# Patient Record
Sex: MALE | Race: WHITE | NOT HISPANIC OR LATINO | Employment: OTHER | URBAN - METROPOLITAN AREA
[De-identification: names, ages, dates, MRNs, and addresses within clinical notes are randomized per-mention and may not be internally consistent; named-entity substitution may affect disease eponyms.]

---

## 2019-04-29 ENCOUNTER — HOSPITAL ENCOUNTER (OUTPATIENT)
Facility: HOSPITAL | Age: 84
Discharge: NURSING, REHAB OR LONG-TERM CARE | End: 2019-05-01
Attending: EMERGENCY MEDICINE | Admitting: INTERNAL MEDICINE
Payer: MEDICARE

## 2019-04-29 DIAGNOSIS — I50.22 CHRONIC SYSTOLIC HEART FAILURE: ICD-10-CM

## 2019-04-29 DIAGNOSIS — I63.9 STROKE: ICD-10-CM

## 2019-04-29 DIAGNOSIS — R42 VERTIGO: Primary | ICD-10-CM

## 2019-04-29 DIAGNOSIS — I65.02 VERTEBRAL ARTERY OCCLUSION, LEFT: ICD-10-CM

## 2019-04-29 DIAGNOSIS — G45.9 TIA (TRANSIENT ISCHEMIC ATTACK): ICD-10-CM

## 2019-04-29 LAB
ALBUMIN SERPL BCP-MCNC: 3.6 G/DL (ref 3.5–5.2)
ALP SERPL-CCNC: 64 U/L (ref 55–135)
ALT SERPL W/O P-5'-P-CCNC: 11 U/L (ref 10–44)
ANION GAP SERPL CALC-SCNC: 10 MMOL/L (ref 8–16)
AST SERPL-CCNC: 20 U/L (ref 10–40)
BASOPHILS # BLD AUTO: 0.01 K/UL (ref 0–0.2)
BASOPHILS NFR BLD: 0.2 % (ref 0–1.9)
BILIRUB SERPL-MCNC: 0.4 MG/DL (ref 0.1–1)
BUN SERPL-MCNC: 11 MG/DL (ref 8–23)
CALCIUM SERPL-MCNC: 9.2 MG/DL (ref 8.7–10.5)
CHLORIDE SERPL-SCNC: 102 MMOL/L (ref 95–110)
CHOLEST SERPL-MCNC: 197 MG/DL (ref 120–199)
CHOLEST/HDLC SERPL: 4.2 {RATIO} (ref 2–5)
CO2 SERPL-SCNC: 21 MMOL/L (ref 23–29)
CREAT SERPL-MCNC: 1 MG/DL (ref 0.5–1.4)
DIFFERENTIAL METHOD: ABNORMAL
EOSINOPHIL # BLD AUTO: 0 K/UL (ref 0–0.5)
EOSINOPHIL NFR BLD: 0.7 % (ref 0–8)
ERYTHROCYTE [DISTWIDTH] IN BLOOD BY AUTOMATED COUNT: 15 % (ref 11.5–14.5)
EST. GFR  (AFRICAN AMERICAN): >60 ML/MIN/1.73 M^2
EST. GFR  (NON AFRICAN AMERICAN): >60 ML/MIN/1.73 M^2
GLUCOSE SERPL-MCNC: 100 MG/DL (ref 70–110)
GLUCOSE SERPL-MCNC: 103 MG/DL (ref 70–110)
HCT VFR BLD AUTO: 37.5 % (ref 40–54)
HDLC SERPL-MCNC: 47 MG/DL (ref 40–75)
HDLC SERPL: 23.9 % (ref 20–50)
HGB BLD-MCNC: 12.5 G/DL (ref 14–18)
INR PPP: 1 (ref 0.8–1.2)
LDLC SERPL CALC-MCNC: 136 MG/DL (ref 63–159)
LYMPHOCYTES # BLD AUTO: 1.2 K/UL (ref 1–4.8)
LYMPHOCYTES NFR BLD: 28 % (ref 18–48)
MCH RBC QN AUTO: 28.5 PG (ref 27–31)
MCHC RBC AUTO-ENTMCNC: 33.3 G/DL (ref 32–36)
MCV RBC AUTO: 86 FL (ref 82–98)
MONOCYTES # BLD AUTO: 0.5 K/UL (ref 0.3–1)
MONOCYTES NFR BLD: 12.5 % (ref 4–15)
NEUTROPHILS # BLD AUTO: 2.4 K/UL (ref 1.8–7.7)
NEUTROPHILS NFR BLD: 58.4 % (ref 38–73)
NONHDLC SERPL-MCNC: 150 MG/DL
PLATELET # BLD AUTO: 245 K/UL (ref 150–350)
PMV BLD AUTO: 10.3 FL (ref 9.2–12.9)
POCT GLUCOSE: 103 MG/DL (ref 70–110)
POTASSIUM SERPL-SCNC: 4.3 MMOL/L (ref 3.5–5.1)
PROT SERPL-MCNC: 6.9 G/DL (ref 6–8.4)
PROTHROMBIN TIME: 10.5 SEC (ref 9–12.5)
RBC # BLD AUTO: 4.38 M/UL (ref 4.6–6.2)
SODIUM SERPL-SCNC: 133 MMOL/L (ref 136–145)
TRIGL SERPL-MCNC: 70 MG/DL (ref 30–150)
TSH SERPL DL<=0.005 MIU/L-ACNC: 1.02 UIU/ML (ref 0.4–4)
WBC # BLD AUTO: 4.15 K/UL (ref 3.9–12.7)

## 2019-04-29 PROCEDURE — 82962 GLUCOSE BLOOD TEST: CPT

## 2019-04-29 PROCEDURE — 80053 COMPREHEN METABOLIC PANEL: CPT

## 2019-04-29 PROCEDURE — 25500020 PHARM REV CODE 255: Performed by: EMERGENCY MEDICINE

## 2019-04-29 PROCEDURE — 82607 VITAMIN B-12: CPT

## 2019-04-29 PROCEDURE — 82746 ASSAY OF FOLIC ACID SERUM: CPT

## 2019-04-29 PROCEDURE — 99291 CRITICAL CARE FIRST HOUR: CPT | Mod: 25

## 2019-04-29 PROCEDURE — 93005 ELECTROCARDIOGRAM TRACING: CPT

## 2019-04-29 PROCEDURE — 80061 LIPID PANEL: CPT

## 2019-04-29 PROCEDURE — 85025 COMPLETE CBC W/AUTO DIFF WBC: CPT

## 2019-04-29 PROCEDURE — 25000003 PHARM REV CODE 250: Performed by: EMERGENCY MEDICINE

## 2019-04-29 PROCEDURE — 85610 PROTHROMBIN TIME: CPT

## 2019-04-29 PROCEDURE — 84443 ASSAY THYROID STIM HORMONE: CPT

## 2019-04-29 RX ORDER — CLOPIDOGREL BISULFATE 75 MG/1
75 TABLET ORAL
Status: COMPLETED | OUTPATIENT
Start: 2019-04-29 | End: 2019-04-29

## 2019-04-29 RX ORDER — ASPIRIN 325 MG
325 TABLET ORAL
Status: COMPLETED | OUTPATIENT
Start: 2019-04-29 | End: 2019-04-29

## 2019-04-29 RX ADMIN — ASPIRIN 325 MG ORAL TABLET 325 MG: 325 PILL ORAL at 10:04

## 2019-04-29 RX ADMIN — CLOPIDOGREL BISULFATE 75 MG: 75 TABLET ORAL at 10:04

## 2019-04-29 RX ADMIN — IOHEXOL 100 ML: 350 INJECTION, SOLUTION INTRAVENOUS at 09:04

## 2019-04-30 LAB
ALBUMIN SERPL BCP-MCNC: 3.3 G/DL (ref 3.5–5.2)
ALP SERPL-CCNC: 61 U/L (ref 55–135)
ALT SERPL W/O P-5'-P-CCNC: 10 U/L (ref 10–44)
ANION GAP SERPL CALC-SCNC: 11 MMOL/L (ref 8–16)
APTT BLDCRRT: 31 SEC (ref 21–32)
AST SERPL-CCNC: 20 U/L (ref 10–40)
BASOPHILS # BLD AUTO: 0.01 K/UL (ref 0–0.2)
BASOPHILS NFR BLD: 0.3 % (ref 0–1.9)
BILIRUB SERPL-MCNC: 0.5 MG/DL (ref 0.1–1)
BUN SERPL-MCNC: 10 MG/DL (ref 8–23)
CALCIUM SERPL-MCNC: 9 MG/DL (ref 8.7–10.5)
CHLORIDE SERPL-SCNC: 102 MMOL/L (ref 95–110)
CO2 SERPL-SCNC: 21 MMOL/L (ref 23–29)
CREAT SERPL-MCNC: 0.9 MG/DL (ref 0.5–1.4)
DIFFERENTIAL METHOD: ABNORMAL
EOSINOPHIL # BLD AUTO: 0 K/UL (ref 0–0.5)
EOSINOPHIL NFR BLD: 1.2 % (ref 0–8)
ERYTHROCYTE [DISTWIDTH] IN BLOOD BY AUTOMATED COUNT: 15 % (ref 11.5–14.5)
EST. GFR  (AFRICAN AMERICAN): >60 ML/MIN/1.73 M^2
EST. GFR  (NON AFRICAN AMERICAN): >60 ML/MIN/1.73 M^2
ESTIMATED AVG GLUCOSE: 111 MG/DL (ref 68–131)
FOLATE SERPL-MCNC: 14.5 NG/ML (ref 4–24)
GLUCOSE SERPL-MCNC: 84 MG/DL (ref 70–110)
HBA1C MFR BLD HPLC: 5.5 % (ref 4–5.6)
HCT VFR BLD AUTO: 37.4 % (ref 40–54)
HGB BLD-MCNC: 12.4 G/DL (ref 14–18)
INR PPP: 1 (ref 0.8–1.2)
LYMPHOCYTES # BLD AUTO: 1.2 K/UL (ref 1–4.8)
LYMPHOCYTES NFR BLD: 36.4 % (ref 18–48)
MAGNESIUM SERPL-MCNC: 1.8 MG/DL (ref 1.6–2.6)
MCH RBC QN AUTO: 28.5 PG (ref 27–31)
MCHC RBC AUTO-ENTMCNC: 33.2 G/DL (ref 32–36)
MCV RBC AUTO: 86 FL (ref 82–98)
MONOCYTES # BLD AUTO: 0.4 K/UL (ref 0.3–1)
MONOCYTES NFR BLD: 12.3 % (ref 4–15)
NEUTROPHILS # BLD AUTO: 1.6 K/UL (ref 1.8–7.7)
NEUTROPHILS NFR BLD: 49.8 % (ref 38–73)
PHOSPHATE SERPL-MCNC: 3.7 MG/DL (ref 2.7–4.5)
PLATELET # BLD AUTO: 234 K/UL (ref 150–350)
PMV BLD AUTO: 10.1 FL (ref 9.2–12.9)
POTASSIUM SERPL-SCNC: 4.3 MMOL/L (ref 3.5–5.1)
PROT SERPL-MCNC: 6.5 G/DL (ref 6–8.4)
PROTHROMBIN TIME: 10.4 SEC (ref 9–12.5)
RBC # BLD AUTO: 4.35 M/UL (ref 4.6–6.2)
SODIUM SERPL-SCNC: 134 MMOL/L (ref 136–145)
TROPONIN I SERPL DL<=0.01 NG/ML-MCNC: 0.05 NG/ML (ref 0–0.03)
VIT B12 SERPL-MCNC: 337 PG/ML (ref 210–950)
WBC # BLD AUTO: 3.24 K/UL (ref 3.9–12.7)

## 2019-04-30 PROCEDURE — 83735 ASSAY OF MAGNESIUM: CPT

## 2019-04-30 PROCEDURE — 83036 HEMOGLOBIN GLYCOSYLATED A1C: CPT

## 2019-04-30 PROCEDURE — 97116 GAIT TRAINING THERAPY: CPT

## 2019-04-30 PROCEDURE — 97161 PT EVAL LOW COMPLEX 20 MIN: CPT

## 2019-04-30 PROCEDURE — 92610 EVALUATE SWALLOWING FUNCTION: CPT

## 2019-04-30 PROCEDURE — 25000003 PHARM REV CODE 250: Performed by: INTERNAL MEDICINE

## 2019-04-30 PROCEDURE — 86580 TB INTRADERMAL TEST: CPT | Performed by: STUDENT IN AN ORGANIZED HEALTH CARE EDUCATION/TRAINING PROGRAM

## 2019-04-30 PROCEDURE — 94761 N-INVAS EAR/PLS OXIMETRY MLT: CPT

## 2019-04-30 PROCEDURE — 84100 ASSAY OF PHOSPHORUS: CPT

## 2019-04-30 PROCEDURE — 63600175 PHARM REV CODE 636 W HCPCS: Performed by: STUDENT IN AN ORGANIZED HEALTH CARE EDUCATION/TRAINING PROGRAM

## 2019-04-30 PROCEDURE — 85610 PROTHROMBIN TIME: CPT

## 2019-04-30 PROCEDURE — 36415 COLL VENOUS BLD VENIPUNCTURE: CPT

## 2019-04-30 PROCEDURE — 96372 THER/PROPH/DIAG INJ SC/IM: CPT

## 2019-04-30 PROCEDURE — G0378 HOSPITAL OBSERVATION PER HR: HCPCS

## 2019-04-30 PROCEDURE — 84484 ASSAY OF TROPONIN QUANT: CPT

## 2019-04-30 PROCEDURE — 85730 THROMBOPLASTIN TIME PARTIAL: CPT

## 2019-04-30 PROCEDURE — 97165 OT EVAL LOW COMPLEX 30 MIN: CPT

## 2019-04-30 PROCEDURE — 80053 COMPREHEN METABOLIC PANEL: CPT

## 2019-04-30 PROCEDURE — 63600175 PHARM REV CODE 636 W HCPCS: Performed by: INTERNAL MEDICINE

## 2019-04-30 PROCEDURE — 85025 COMPLETE CBC W/AUTO DIFF WBC: CPT

## 2019-04-30 PROCEDURE — 92523 SPEECH SOUND LANG COMPREHEN: CPT

## 2019-04-30 RX ORDER — CLOPIDOGREL BISULFATE 75 MG/1
75 TABLET ORAL DAILY
Status: DISCONTINUED | OUTPATIENT
Start: 2019-04-30 | End: 2019-04-30

## 2019-04-30 RX ORDER — HEPARIN SODIUM 5000 [USP'U]/ML
5000 INJECTION, SOLUTION INTRAVENOUS; SUBCUTANEOUS EVERY 8 HOURS
Status: DISCONTINUED | OUTPATIENT
Start: 2019-04-30 | End: 2019-05-01 | Stop reason: HOSPADM

## 2019-04-30 RX ORDER — CLOPIDOGREL BISULFATE 75 MG/1
75 TABLET ORAL DAILY
Status: DISCONTINUED | OUTPATIENT
Start: 2019-04-30 | End: 2019-05-01 | Stop reason: HOSPADM

## 2019-04-30 RX ORDER — ASPIRIN 81 MG/1
81 TABLET ORAL DAILY
Status: DISCONTINUED | OUTPATIENT
Start: 2019-04-30 | End: 2019-05-01 | Stop reason: HOSPADM

## 2019-04-30 RX ORDER — DIPHENHYDRAMINE HCL 25 MG
25 CAPSULE ORAL EVERY 6 HOURS PRN
COMMUNITY

## 2019-04-30 RX ORDER — RAMELTEON 8 MG/1
8 TABLET ORAL NIGHTLY
Status: DISCONTINUED | OUTPATIENT
Start: 2019-04-30 | End: 2019-05-01 | Stop reason: HOSPADM

## 2019-04-30 RX ORDER — SODIUM CHLORIDE 0.9 % (FLUSH) 0.9 %
10 SYRINGE (ML) INJECTION
Status: DISCONTINUED | OUTPATIENT
Start: 2019-04-30 | End: 2019-05-01 | Stop reason: HOSPADM

## 2019-04-30 RX ORDER — ATORVASTATIN CALCIUM 40 MG/1
40 TABLET, FILM COATED ORAL DAILY
Status: DISCONTINUED | OUTPATIENT
Start: 2019-04-30 | End: 2019-05-01 | Stop reason: HOSPADM

## 2019-04-30 RX ADMIN — HEPARIN SODIUM 5000 UNITS: 5000 INJECTION, SOLUTION INTRAVENOUS; SUBCUTANEOUS at 09:04

## 2019-04-30 RX ADMIN — TUBERCULIN PURIFIED PROTEIN DERIVATIVE 5 UNITS: 5 INJECTION INTRADERMAL at 02:04

## 2019-04-30 RX ADMIN — CLOPIDOGREL BISULFATE 75 MG: 75 TABLET ORAL at 09:04

## 2019-04-30 RX ADMIN — ATORVASTATIN CALCIUM 40 MG: 40 TABLET, FILM COATED ORAL at 09:04

## 2019-04-30 RX ADMIN — HEPARIN SODIUM 5000 UNITS: 5000 INJECTION, SOLUTION INTRAVENOUS; SUBCUTANEOUS at 02:04

## 2019-04-30 RX ADMIN — ASPIRIN 81 MG: 81 TABLET, COATED ORAL at 09:04

## 2019-04-30 NOTE — ED NOTES
"Pt currently in bed resting comfortably. Denies pain, denies dizziness. States "I feel much better". NAD noted. Call light within reach. Will continue to monitor  "

## 2019-04-30 NOTE — PROGRESS NOTES
The Sw spoke to Magdalene at [facility] who states she can accept the pt tomorrow and he has been medically accepted. The Sw went into the pt's room along with Carmen and one of the doctors. The pt agreed to go to [facility] tomorrow and will relinquish his $1500 a month check which he receives on the 3rd. The pt will d/c in the morning to [facility].

## 2019-04-30 NOTE — CONSULTS
U Cardiology Consult     Primary Attending Physician: Aaron  Consultant Attending: Jose  Consultant Fellow: Catrachito    Reason for Consult:     CAD s/p CABG not taking medications    Subjective:      History of Present Illness:  Nicolas Wolff is a 88 y.o. male who  has a past medical history of CHF (congestive heart failure), Coronary artery disease, and Hypertension.. The patient presented to the [facility] on 4/29/2019 with a primary complaint of Dizziness (Dizziness, runny nose, and congested ears that started today)    He presented to [facility] with a complaint of weakness and is being worked up by neurology for TIA vs CVA. He has a history of CAD s/p CABG in 2009 but he doesn't follow with cardiology and is not currently. He says that he stopped taking medications after his living situation changed and he is currently trying to get placed in a nursing home. He has not had chest pains, SMALLWOOD, or palpitations. He says that his dizziness started in his head and then spread to his body. He has not had other episodes of LOC. He denied LOC on my interview.       Past Medical History:  Past Medical History:   Diagnosis Date    CHF (congestive heart failure)     Coronary artery disease     Hypertension        Past Surgical History:  Past Surgical History:   Procedure Laterality Date    BACK SURGERY      CORONARY ARTERY BYPASS GRAFT         Allergies:  Review of patient's allergies indicates:  No Known Allergies    Medications:   In-Hospital Scheduled Medications:   aspirin  81 mg Oral Daily    atorvastatin  40 mg Oral Daily    clopidogrel  75 mg Oral Daily    heparin (porcine)  5,000 Units Subcutaneous Q8H    tuberculin  5 Units Intradermal Once      In-Hospital PRN Medications:  pneumoc 13-edwin conj-dip cr(PF), sodium chloride 0.9%   In-Hospital IV Infusion Medications:     Home Medications:  Prior to Admission medications    Medication Sig Start Date End Date Taking? Authorizing Provider    aspirin 500 MG tablet Take 500 mg by mouth once daily.   Yes Historical Provider, MD   diphenhydrAMINE (BENADRYL) 25 mg capsule Take 25 mg by mouth every 6 (six) hours as needed for Itching.   Yes Historical Provider, MD   diphenhydramine-acetaminophen (TYLENOL PM)  mg Tab Take 1 tablet by mouth nightly as needed.   Yes Historical Provider, MD   multivitamin capsule Take 1 capsule by mouth once daily.   Yes Historical Provider, MD   aspirin 81 MG Chew Take 81 mg by mouth once daily.    Historical Provider, MD   atorvastatin (LIPITOR) 20 MG tablet Take 20 mg by mouth once daily.  4/30/19  Historical Provider, MD   docusate sodium (COLACE) 100 MG capsule Take 100 mg by mouth 2 (two) times daily.  4/30/19  Historical Provider, MD   furosemide (LASIX) 20 MG tablet Take 20 mg by mouth 2 (two) times daily.  4/30/19  Historical Provider, MD   guaifenesin-codeine 100-10 mg/5 ml (TUSSI-ORGANIDIN NR)  mg/5 mL syrup Take 5 mLs by mouth 3 (three) times daily as needed for Cough.  4/30/19  Historical Provider, MD   hydrocodone-acetaminophen 10-325mg (NORCO)  mg Tab Take by mouth.  4/30/19  Historical Provider, MD   levofloxacin (LEVAQUIN) 500 MG tablet Take 500 mg by mouth once daily.  4/30/19  Historical Provider, MD   lisinopril 10 MG tablet Take 10 mg by mouth once daily.  4/30/19  Historical Provider, MD   meloxicam (MOBIC) 7.5 MG tablet Take 1 tablet (7.5 mg total) by mouth once daily. 3/6/16 4/30/19  Jessy Calhoun MD   methylPREDNISolone (MEDROL DOSEPACK) 4 mg tablet Take 4 mg by mouth. use as directed  4/30/19  Historical Provider, MD   oxycodone-acetaminophen (PERCOCET) 5-325 mg per tablet Take half a tablet every 6 hours as needed for pain 3/6/16 4/30/19  Jessy Calhoun MD   pantoprazole (PROTONIX) 40 MG tablet Take 40 mg by mouth once daily.  4/30/19  Historical Provider, MD       Family History:  History reviewed. No pertinent family history.    Social History:  Social History     Tobacco Use     "Smoking status: Former Smoker    Smokeless tobacco: Never Used   Substance Use Topics    Alcohol use: No    Drug use: No       Review of Systems:  Constitutional: Negative for fever, chills, weight loss  Eyes: Negative for changes in vision, diplopia, pain, or discharge  Ears: Negative for changes in hearing, tinnitus,  pain, or discharge,  Mouth and Throat: Negative for mouth or throat soreness, bleeding gums, lesions, petechia  Respiratory: Negative for cough, sputum production, shortness of breath  Cardiovascular: Negative for chest pain, palpitations, dyspnea on excursion, syncope    Gastrointestinal: Negative for abdominal pain, nausea, vomiting, diarrhea, constipation  Genitourinary: Negative for dysuria, hematuria, frequency, urgency or incontinence  Musculoskeletal: Negative for weakness, pain, swelling, decreased ROM  Skin: Negative for rash, pruritis, breakdown, hematoma, ecchymosis, petechia, jaundice  Neuro: Negative for headache, lightheadedness, syncope or weakness  Psyc: Negative for changes in mood or behavior, Brissa SI or HI  All other systems are reviewed and are negative.       Objective:   Last 24 Hour Vital Signs:  BP  Min: 119/80  Max: 153/86  Temp  Av.8 °F (36.6 °C)  Min: 97.6 °F (36.4 °C)  Max: 98 °F (36.7 °C)  Pulse  Av.6  Min: 79  Max: 102  Resp  Av.8  Min: 16  Max: 20  SpO2  Av %  Min: 95 %  Max: 99 %  Height  Av' 8" (172.7 cm)  Min: 5' 8" (172.7 cm)  Max: 5' 8" (172.7 cm)  Weight  Av.9 kg (154 lb 1.3 oz)  Min: 69 kg (152 lb 1.9 oz)  Max: 71.7 kg (158 lb)  No intake/output data recorded.    Physical Examination:  General: Alert, Awake, Oriented x 3, No Acute Distress,   Head: normocephalic, atraumatic  Eyes: PERRL, EOMI, no scleral icterus, no conjunctival pallor  Nose: no tenderness to palpation, no drainage or erythema appreciated  Mouth and Throat: poor dentition, no lesions noted, moist mucus membranes  Neck: no lymphadenopathy appreciated, No carotid " bruits, JVD 8 cm  Respiratory: lungs clear to ascultation bilaterally, no accessory use of muscles   Cardiovascular: regular rate with regular rhythm, no murmurs, rubs, or S3, S4, normal apical impulse. Sternotomy scar seen   Gastrointestinal: soft, nontender, nondistended,no rebound or guarding, normoactive bowel sounds.   Extremities: pulses 2+ in all extremities, no pitting edema, normal ROM   Neuro:  full strength even in all extremities, no sensory deficits.   Skin: no lesions, rashes, or breakdown.       Laboratory Results:  Most Recent Data:  CBC:   Lab Results   Component Value Date    WBC 3.24 (L) 04/30/2019    HGB 12.4 (L) 04/30/2019    HCT 37.4 (L) 04/30/2019     04/30/2019    MCV 86 04/30/2019    RDW 15.0 (H) 04/30/2019     BMP:   Lab Results   Component Value Date     (L) 04/30/2019    K 4.3 04/30/2019     04/30/2019    CO2 21 (L) 04/30/2019    BUN 10 04/30/2019    GLU 84 04/30/2019    CALCIUM 9.0 04/30/2019    MG 1.8 04/30/2019    PHOS 3.7 04/30/2019     LFTs:   Lab Results   Component Value Date    PROT 6.5 04/30/2019    ALBUMIN 3.3 (L) 04/30/2019    BILITOT 0.5 04/30/2019    AST 20 04/30/2019    ALKPHOS 61 04/30/2019    ALT 10 04/30/2019     Coags:   Lab Results   Component Value Date    INR 1.0 04/30/2019     FLP:   Lab Results   Component Value Date    CHOL 197 04/29/2019    HDL 47 04/29/2019    LDLCALC 136.0 04/29/2019    TRIG 70 04/29/2019    CHOLHDL 23.9 04/29/2019     DM:   Lab Results   Component Value Date    HGBA1C 5.5 04/30/2019    LDLCALC 136.0 04/29/2019    CREATININE 0.9 04/30/2019     Thyroid:   Lab Results   Component Value Date    TSH 1.016 04/29/2019     Anemia:   Lab Results   Component Value Date    WXKHRBJI28 337 04/29/2019    FOLATE 14.5 04/29/2019     Cardiac:   Lab Results   Component Value Date    TROPONINI 0.048 (H) 04/30/2019     Urinalysis:   Lab Results   Component Value Date    COLORU CAMRON 04/04/2010    SPECGRAV 1.020 04/04/2010    NITRITE NEG  04/04/2010    KETONESU text 04/04/2010    UROBILINOGEN NEG 04/04/2010       Trended Lab Data:  Recent Labs   Lab 04/29/19 1955 04/30/19  0428   WBC 4.15 3.24*   HGB 12.5* 12.4*   HCT 37.5* 37.4*    234   MCV 86 86   RDW 15.0* 15.0*   * 134*   K 4.3 4.3    102   CO2 21* 21*   BUN 11 10    84   PROT 6.9 6.5   ALBUMIN 3.6 3.3*   BILITOT 0.4 0.5   AST 20 20   ALKPHOS 64 61   ALT 11 10       Trended Cardiac Data:  Recent Labs   Lab 04/30/19  0428   TROPONINI 0.048*       Other Results:  EKG (my interpretation): normal sinus rhythm, RBBB.    Echo  Severely reduced LV EF, Global wall motion abnormalities, 4 chamber dilation.   Moderate aortic stenosis. .        Assessment:     Nicolas Wolff is a 88 y.o. male with CAD s/p CABG x 3 and HFrEF, lost to follow up with cardiology. He has no contraindication to being put back on medications. No acute changes to cardiac disease.     Plan:     - resume BP medications as tolerated would start with BB then ACEi given cardiomyopathy. He should have GDMT titrated up as an outpatient.  Started on DAPT by neurology for TIA. Continue at least one antiplatelet agent. Limited evidence in benefit of statin therapy in elderly patients with CAD but it will be recommended with his acute TIA. Will arrange follow up with outpatient cardiology with [facility] at [facility].     Thank you for allowing us to participate in the care of this patient. Please contact me if you have any questions regarding this consult.    Fred Winston  [facility]

## 2019-04-30 NOTE — PLAN OF CARE
Problem: Occupational Therapy Goal  Goal: Occupational Therapy Goal  Goals to be met by: 5/30/19     Patient will increase functional independence with ADLs by performing:    LE Dressing with Modified [place].  Grooming while standing with Modified [place].  Toileting from toilet with Modified [place] for hygiene and clothing management.   Supine to sit with Modified [place].  Step transfer with Modified [place]  Toilet transfer to toilet with Modified [place].  Increased functional strength to WFL for self care skills and functional mobility.  Upper extremity exercise program x10 reps per handout, with independence.    Outcome: Ongoing (interventions implemented as appropriate)  Pt would benefit from cont OT services in order to maximize functional independence. Recommending HHOT/PT at d/c and shower chair. Pt demonstrates impaired safety awareness, poor insight, and some confusion/delusions raising concerns for living alone.

## 2019-04-30 NOTE — PLAN OF CARE
04/30/19 1359   Post-Acute Status   Post-Acute Authorization Placement  (correction nhp)   Post-Acute Placement Status Referrals Sent  (robbin faxed info to [facility])
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
## 2019-04-30 NOTE — PLAN OF CARE
CM visited with pt who desires nursing home placement.  Pt states he lives at [facility] [address] in [place].  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  He buys meals at local restaurants and convenience meals from grocery store daily.  Pt states he was once at [facility] for skilled nursing and eventually transitioned to skilled nursing.  [facility] his brother became ill and needed to care for him.  After brother's death, he moved in an apartment which was subsequently condemned.  He later moved into the [facility] where he has resided for 2 years now.      Pt states he would like to return to [facility] for skilled nursing care but understands they may not accept him back after leaving first time.  States he does not owe payment.      SW is aware and has begun work on placement.  PPD ordered, LOCET in process.  CM will continue to follow and assist as needed.    Discharge planning brochure provided. White board updated with CM name & contact info.  Pt encouraged to call with any questions or needs. CM will continue to follow patient throughout the transitions of care, and assist with any discharge needs.           04/30/19 1410   Discharge Assessment   Assessment Type Discharge Planning Assessment   Confirmed/corrected address and phone number on facesheet? Yes   Assessment information obtained from? Patient   Expected Length of Stay (days) 2   Communicated expected length of stay with patient/caregiver yes   Prior to hospitilization cognitive status: Alert/Oriented   Prior to hospitalization functional status: Independent;Assistive Equipment   Current cognitive status: Alert/Oriented   Current Functional Status: Independent;Assistive Equipment   Lives With alone   Is patient able to care for self after discharge? Yes   Who are your caregiver(s) and their phone number(s)?   (no contact number listed for daughter, pt does not have numer)   Readmission Within  the Last 30 Days no previous admission in last 30 days   Patient currently being followed by outpatient case management? No   Patient currently receives any other outside agency services? No   Equipment Currently Used at Home cane, straight   Do you have any problems affording any of your prescribed medications? No   Is the patient taking medications as prescribed? yes   Does the patient have transportation home? Yes   Transportation Anticipated   (w/c van)   Does the patient receive [facility] at [facility]? No   Discharge Plan A New Nursing Home placement - alf care facility   DME Needed Upon Discharge  none   Patient/Family in Agreement with Plan yes

## 2019-04-30 NOTE — PLAN OF CARE
Problem: Physical Therapy Goal  Goal: Physical Therapy Goal  Goals to be met by: 19     Patient will increase functional independence with mobility by performin. Supine <> sit with Modified [place]  2. Sit to stand transfer with Modified [place]  3. Bed to chair transfer with Modified [place] using Single-point Cane   4. Gait  x 100 feet with Modified [place] using Single-point Cane .     Outcome: Ongoing (interventions implemented as appropriate)  PT evaluation completed, note to follow. Pt completed stand with SPC and CGA then ambulated 60 ft with SPC and CGA initially then progressed to SBA/supervision. Pt presenting with noted mild confusion and therefore would benefit from assist available at home but pt reporting living alone in a motel. Recommending  PT/OT upon d/c and shower chair for home use.

## 2019-04-30 NOTE — ED PROVIDER NOTES
"Encounter Date: 4/29/2019    SCRIBE #1 NOTE: I, Cherelle Hale, am scribing for, and in the presence of,  Dr. Lefort. I have scribed the entire note.       History     Chief Complaint   Patient presents with    Dizziness     Dizziness, runny nose, and congested ears that started today     A 88 year-old male presents to the ED complaining of dizziness on exertion first noted today at 1630. Patient was drawing at home this evening when he felt "heaviness" to bilateral knees. States "I could not move the other knee forward, so I held onto the door." No fall, syncope, LOC, or head trauma. Patient denies current dizziness sensation while laying in stretcher. Patient is ambulatory with hand assist; endorses dizzy sensation.  Additional symptoms include rhinorrhea, head pressure, and right ear popping which are new today, but secondary to weather change per patient.     The history is provided by the patient.     Review of patient's allergies indicates:  No Known Allergies  Past Medical History:   Diagnosis Date    CHF (congestive heart failure)     Coronary artery disease     Hypertension      Past Surgical History:   Procedure Laterality Date    BACK SURGERY      CORONARY ARTERY BYPASS GRAFT       No family history on file.  Social History     Tobacco Use    Smoking status: Former Smoker    Smokeless tobacco: Never Used   Substance Use Topics    Alcohol use: No    Drug use: No     Review of Systems   Constitutional: Negative for chills and fever.   HENT: Positive for congestion, ear pain and rhinorrhea. Negative for sore throat.    Respiratory: Negative for cough, shortness of breath and wheezing.    Cardiovascular: Negative for chest pain and palpitations.   Gastrointestinal: Negative for abdominal pain, diarrhea, nausea and vomiting.   Genitourinary: Negative for dysuria and hematuria.   Musculoskeletal: Negative for back pain, myalgias and neck pain.   Skin: Negative for rash.   Neurological: Positive for " dizziness (on exertion). Negative for weakness, light-headedness and headaches.   Psychiatric/Behavioral: Negative for confusion.       Physical Exam     Initial Vitals [04/29/19 1850]   BP Pulse Resp Temp SpO2   124/73 100 16 97.8 °F (36.6 °C) 96 %      MAP       --         Physical Exam    Nursing note and vitals reviewed.  Constitutional: He appears well-developed and well-nourished. No distress.   HENT:   Head: Normocephalic and atraumatic.   Mouth/Throat: Oropharynx is clear and moist.   Eyes: Conjunctivae and EOM are normal. Pupils are equal, round, and reactive to light.   EOMI with multi-directional nystagmus    Neck: No JVD present.   Cardiovascular: Normal rate, regular rhythm, normal heart sounds and intact distal pulses.   Pulmonary/Chest: Breath sounds normal. No respiratory distress.   Abdominal: Soft. Bowel sounds are normal. He exhibits no distension. There is no tenderness.   Musculoskeletal: Normal range of motion. He exhibits no edema or tenderness.   Neurological: He is alert and oriented to person, place, and time. He has normal strength.   Finger to nose within normal limits  Negative romberg  Gait normal with assistance; dizziness elicited with exertion   Equal strength to bilateral upper extremities, SILT  Equal strength to bilateral lower extremities, SILT    Skin: Skin is warm and dry.         ED Course   Critical Care  Date/Time: 4/29/2019 10:38 PM  Performed by: Guy J. Lefort, MD  Authorized by: Guy J. Lefort, MD   Direct patient critical care time: 10 minutes  Additional history critical care time: 5 minutes  Ordering / reviewing critical care time: 5 minutes  Documentation critical care time: 5 minutes  Consulting other physicians critical care time: 5 minutes  Consult with family critical care time: 5 minutes  Total critical care time (exclusive of procedural time) : 35 minutes  Critical care was necessary to treat or prevent imminent or life-threatening deterioration of the following  conditions: CNS failure or compromise.        Labs Reviewed - No data to display  EKG Readings: (Independently Interpreted)   NSR, rate 100. RBBB       X-Rays:   Independently Interpreted Readings:   Other Readings:  Reviewed by myself, read by radiology.      Imaging Results          CT Head Without Contrast (Final result)  Result time 04/29/19 20:16:42    Final result by Willie Butterfield MD (04/29/19 20:16:42)                 Impression:      Advanced involutional change without acute infarct or hemorrhage or mass or fracture.      Electronically signed by: Willie Butterfield  Date:    04/29/2019  Time:    20:16             Narrative:    EXAMINATION:  CT HEAD WITHOUT CONTRAST    CLINICAL HISTORY:  Stroke;    TECHNIQUE:  Low dose axial images were obtained through the head.  Coronal and sagittal reformations were also performed. Contrast was not administered.    COMPARISON:  None.    FINDINGS:  There is advanced diffuse involutional change with enlargement of ventricles and portion a large amount of the sulci diffusely.  No midline shift or mass effect.  There is moderate diffuse periventricular and deep white matter hypodensity likely advanced microvascular change.  No acute infarct or hemorrhage or mass.  No subdural collection.    The visualized paranasal sinuses and mastoid sinuses and middle ears appear normal.  There is moderate cerumen in the left external external auditory canal and mild cerumen of the right external artery canal.  No skull fracture found.  No overlying soft tissue swelling.  The globes and orbits and lenses appear normal.  Corpus callosum appears grossly normal.  The pituitary and sella turcica appear grossly normal as well.                               X-Ray Chest AP Portable (Final result)  Result time 04/29/19 20:12:14    Final result by Niru Duran MD (04/29/19 20:12:14)                 Impression:      Nonspecific interstitial prominence which may reflect chronic change versus mild  pulmonary interstitial edema.      Electronically signed by: Niru Duran MD  Date:    04/29/2019  Time:    20:12             Narrative:    EXAMINATION:  XR CHEST AP PORTABLE    CLINICAL HISTORY:  Stroke;    TECHNIQUE:  Single frontal view of the chest was performed.    COMPARISON:  April 2010.    FINDINGS:  Cardiac silhouette is mildly enlarged but stable in size.  Postsurgical sternotomy changes are seen.  Lungs are symmetrically expanded.  There is nonspecific diffuse interstitial prominence.  No evidence of focal consolidative process, pneumothorax, or significant effusion.  No acute osseous abnormality identified.                               Medical Decision Making:   Clinical Tests:   Lab Tests: Reviewed and Ordered  Radiological Study: Ordered and Reviewed  Medical Tests: Reviewed and Ordered                      Clinical Impression:     1. Vertigo    2. Stroke    3. Vertebral artery occlusion, left    4. TIA (transient ischemic attack)      Disposition:   Disposition: Placed in Observation  Condition: Stable      Scribe Attestation I, Dr. Guy Lefort, personally performed the services described in this documentation. All medical record entries made by the scribe were at my direction and in my presence. I have reviewed the chart and agree that the record reflects my personal performance and is accurate and complete. Guy Lefort, MD.  2:15 AM 05/01/2019                      Guy J. Lefort, MD  05/01/19 0225

## 2019-04-30 NOTE — PLAN OF CARE
Recommendations     1. Continue current diet  -if po intake <50% with meals add boost plus bid   2. RD to monitor     Goals: Meet >85% EEN daily  Nutrition Goal Status: new  Communication of RD Recs: reviewed with RN(POC)
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
## 2019-04-30 NOTE — ED NOTES
Pt presents to the ED with c/o intermittent dizziness. Pt states he was at home looking at a drawing when he began to feel dizzy. Pt states he was able to support himself in a door way and call for help. Denies falling/LOC. Denies n/v. Pt states dizziness has resolved at this time. Denies pain.

## 2019-04-30 NOTE — CONSULTS
"  [facility]  Adult Nutrition  Consult Note    SUMMARY     Recommendations    1. Continue current diet  -if po intake <50% with meals add boost plus bid   2. RD to monitor    Goals: Meet >85% EEN daily  Nutrition Goal Status: new  Communication of RD Recs: reviewed with RN(POC)    Reason for Assessment    Reason For Assessment: consult  Diagnosis: (TIA w/u)  Relevant Medical History: CAD, HTN, CHF, CABG  Interdisciplinary Rounds: did not attend    General Information Comments: SLP advanced diet to regular texture, thin liquids. Pt ate ~70% of breakfast. Denies n/v/chewing/swallowing aside from chronic dry mouth. Pt reports weight stable ~ 150#. Pt eats mostly fast food, microwave meals due to living situation- aware of low sodium diet recs with CHF, but difficult to follow with hotel room living. NFPE: 4/30/2019: Adequate fat, LBM adequate in lower extremities, some age appropriate muscle loss in upper arm area.     Nutrition Discharge Planning: Low sodium diet to meet estimated needs.    Nutrition Risk Screen    Nutrition Risk Screen: no indicators present    Nutrition/Diet History    Spiritual, Cultural Beliefs, Adventism Practices, Values that Affect Care: no  Food Allergies: NKFA  Factors Affecting Nutritional Intake: socio-economic    Anthropometrics    Temp: 97.6 °F (36.4 °C)  Height Method: Stated  Height: 5' 8" (172.7 cm)  Height (inches): 68 in  Weight Method: Bed Scale  Weight: 69 kg (152 lb 1.9 oz)  Weight (lb): 152.12 lb  Ideal Body Weight (IBW), Male: 154 lb  % Ideal Body Weight, Male (lb): 98.78 lb  BMI (Calculated): 23.2  BMI Grade: 18.5-24.9 - normal       Lab/Procedures/Meds    Pertinent Labs Reviewed: reviewed  Pertinent Medications Reviewed: reviewed    Estimated/Assessed Needs    Weight Used For Calorie Calculations: 69 kg (152 lb 1.9 oz)  Energy Calorie Requirements (kcal): 1700 kcal (x 1.25)  [facility]  Protein Requirements: 70-82g " (1-1.2g/kg)  Weight Used For Protein Calculations: 69 kg (152 lb 1.9 oz)     Estimated Fluid Requirement Method: RDA Method  RDA Method (mL): 1700       Nutrition Prescription Ordered    Current Diet Order: Cardiac    Evaluation of Received Nutrient/Fluid Intake    I/O: reviewed  Energy Calories Required: meeting needs  Protein Required: meeting needs  Fluid Required: (per MD)  Comments: LBM 4/29  Tolerance: tolerating  % Intake of Estimated Energy Needs: 75 - 100 %  % Meal Intake: 75 %    Nutrition Risk    Level of Risk/Frequency of Follow-up: (1 x week)     Assessment and Plan    Nutrition Problem  Decreased need for sodium    Related to (etiology):   Cardiac sx    Signs and Symptoms (as evidenced by):   CHF dx    Interventions  Sodium reduced diet  Collaboration with providers    Nutrition Diagnosis Status:   New        Monitor and Evaluation    Food and Nutrient Intake: energy intake, food and beverage intake  Food and Nutrient Adminstration: diet order  Anthropometric Measurements: weight, weight change  Biochemical Data, Medical Tests and Procedures: electrolyte and renal panel  Nutrition-Focused Physical Findings: overall appearance     Malnutrition Assessment       Subcutaneous Fat Loss (Final Summary): well nourished  Muscle Loss Evaluation (Final Summary): (age appropriate losses )         Nutrition Follow-Up    RD Follow-up?: Yes

## 2019-04-30 NOTE — H&P
"[facility] Medicine H&P Note     Admitting Team: [facility] Hospitalist Team B  Attending Physician: Vishal Walker MD  Resident: Foster  Intern: Silas     Date of Admit: 4/29/2019    Chief Complaint     Dizziness x 4 hours    Subjective:      History of Present Illness:  Nicolas Wolff is a 88 y.o. male who  has a past medical history of CHF (congestive heart failure), Coronary artery disease, and Hypertension.. The patient presented to [facility] on 4/29/2019 with a primary complaint of Dizziness (Dizziness, runny nose, and congested ears that started today)    Patient is an exceedingly poor historian.    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 The dizziness is described as "uneasy and off balance". He states he did not feel like the room was spinning and denies any change in vision. At that point, he stood up from the bed but felt uneasy on his feet so he called EMS. He denies any fall or LOC. Patient states that he has been experiencing rhinorrhea with congestion and an "ear popping" on the right side for the last few days, but deals with seasonal allergies chronically. He denies any headaches, blurry vision, palpitations, chest pain, fevers, chills, cough, abdominal pain or change in BMs. He has been eating and drinking normally and denies any sick contacts at home.    Past Medical History:  Past Medical History:   Diagnosis Date    CHF (congestive heart failure)     Coronary artery disease     Hypertension        Past Surgical History:  Past Surgical History:   Procedure Laterality Date    BACK SURGERY      CORONARY ARTERY BYPASS GRAFT  2009       Allergies:  No Known Allergies    Home Medications:  Patient reports    ASA 81 occasionally  "Night time medication" for allergies    Family " "History:  Father had heart disease  No known stroke history    Social History:  Social History     Tobacco Use    Smoking status: Never    Smokeless tobacco: Never Used   Substance Use Topics    Alcohol use: No    Drug use: No       Review of Systems:  Pertinent items are noted in HPI. All other systems are reviewed and are negative.    Health Maintaince :   Primary Care Physician: Dr. Morgan    Immunizations:   TDap Not UTD    Flu Not UTD   Pna Not UTD    Cancer Screening:  Colonoscopy: Not UTD     Objective:   Last 24 Hour Vital Signs:  BP  Min: 119/80  Max: 124/73  Temp  Av.9 °F (36.6 °C)  Min: 97.8 °F (36.6 °C)  Max: 98 °F (36.7 °C)  Pulse  Av  Min: 94  Max: 100  Resp  Av.5  Min: 16  Max: 19  SpO2  Av %  Min: 96 %  Max: 98 %  Height  Av' 8" (172.7 cm)  Min: 5' 8" (172.7 cm)  Max: 5' 8" (172.7 cm)  Weight  Av.7 kg (158 lb)  Min: 71.7 kg (158 lb)  Max: 71.7 kg (158 lb)  Body mass index is 24.02 kg/m².  No intake/output data recorded.    Physical Examination:  General: no acute distress, calm, cooperative, appears stated age  Head: normocephalic, atraumatic    Eyes: EOMI, PERRL, anicteric sclera, clear conjunctiva  Ears, Nose, Throat: no rhinorrhea, oropharynx clear without erythema or exudate, MMM   Neck: supple, JVP ~6 cm  Cardiovascular: regular rate, regular rhythm, audible S1/S2 without murmurs, rubs, or extra heart sounds; radial, DP, and PT pulses 2+ and symmetric  Pulmonary: CTAB, normal work of breathing without accessory muscle use, symmetric chest rise  Abdomen: soft, non-tender, non-distended, no rebound or guarding, normoactive BS  MSKL: full passive and active ROM  Lymphatic: no LAD appreciated  Skin: without cyanosis, clubbing, or edema   Neurologic: A&Ox4, no focal deficits, CNII-XII grossly intact, SILT throughout, 5/5 strength in BUE/BLE; gait normal with assistance; no nystagmus appreciated    NIH stroke scale 0 on my evaluation    Laboratory:  Most Recent " Data:  CBC:   Lab Results   Component Value Date    WBC 4.15 04/29/2019    HGB 12.5 (L) 04/29/2019    HCT 37.5 (L) 04/29/2019     04/29/2019    MCV 86 04/29/2019    RDW 15.0 (H) 04/29/2019     WBC Differential: 58.4 % N, 0 % Bands, 28 % L, 0.12.5 % M, 0.7 % Eo, 0.2 % Baso    BMP:   Lab Results   Component Value Date     (L) 04/29/2019    K 4.3 04/29/2019     04/29/2019    CO2 21 (L) 04/29/2019    BUN 11 04/29/2019    CREATININE 1.0 04/29/2019     04/29/2019    CALCIUM 9.2 04/29/2019    MG 2.3 04/05/2010    PHOS 4.1 04/05/2010     LFTs:   Lab Results   Component Value Date    PROT 6.9 04/29/2019    ALBUMIN 3.6 04/29/2019    BILITOT 0.4 04/29/2019    AST 20 04/29/2019    ALKPHOS 64 04/29/2019    ALT 11 04/29/2019     Coags:   Lab Results   Component Value Date    INR 1.0 04/29/2019     FLP:   Lab Results   Component Value Date    CHOL 197 04/29/2019    HDL 47 04/29/2019    LDLCALC 136.0 04/29/2019    TRIG 70 04/29/2019    CHOLHDL 23.9 04/29/2019     DM:   Lab Results   Component Value Date    LDLCALC 136.0 04/29/2019    CREATININE 1.0 04/29/2019     Thyroid:   Lab Results   Component Value Date    TSH 1.016 04/29/2019     Anemia: No results found for: IRON, TIBC, FERRITIN, [id number], FOLATE  Cardiac: No results found for: TROPONINI, CKTOTAL, CKMB, BNP  Urinalysis:   Lab Results   Component Value Date    COLORU CAMRON 04/04/2010    SPECGRAV 1.020 04/04/2010    NITRITE NEG 04/04/2010    KETONESU text 04/04/2010    UROBILINOGEN NEG 04/04/2010    WBCUA 2-5 04/04/2010       Trended Lab Data:  Recent Labs   Lab 04/29/19 1955   WBC 4.15   HGB 12.5*   HCT 37.5*      MCV 86   RDW 15.0*   *   K 4.3      CO2 21*   BUN 11   CREATININE 1.0      PROT 6.9   ALBUMIN 3.6   BILITOT 0.4   AST 20   ALKPHOS 64   ALT 11       Trended Cardiac Data:  No results for input(s): TROPONINI, CKTOTAL, CKMB, BNP in the last 168 hours.    Microbiology Data:  None    Other Results:  EKG (my  interpretation):   NSR with RBBB and left anterior fascicular block    Radiology:  Imaging Results          CTA Head and Neck (xpd) (Final result)  Result time 04/29/19 22:05:14    Final result by Niru Duran MD (04/29/19 22:05:14)                 Impression:      1. 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  Reconstitution of flow is seen distally within the left vertebral artery distal V2 segment.  2. Approximately 50% stenosis involving the bilateral internal carotid artery origins.  3. Small caliber vertebrobasilar system with persistent bilateral fetal circulation seen with prominent bilateral PCOM's supplying flow to the PCA's.      Electronically signed by: Niru Duran MD  Date:    04/29/2019  Time:    22:05             Narrative:    EXAMINATION:  CTA HEAD AND NECK (XPD)    CLINICAL HISTORY:  Vertigo, persistent, central;    TECHNIQUE:  Non contrast low dose axial images were obtained through the head.  CT angiogram was performed from the level of the reynaldo to the top of the head following the IV administration of 100mL of Omnipaque 350.   Sagittal and coronal reconstructions and maximum intensity projection reconstructions were performed. Arterial stenosis percentages are based on NASCET measurement criteria.    COMPARISON:  CT head from the same date.    FINDINGS:  There is generalized cerebral volume loss with chronic microvascular ischemic disease.  No evidence of abnormal intracranial or parenchymal enhancement on post-contrast imaging.    CTA Neck: Atherosclerotic plaquing of the arch and origin of the great vessels without significant focal stenosis.  The origins of the right brachiocephalic, left common carotid and left subclavian arteries from the arch are within normal limits.  Plaquing with stenosis is seen at the left subclavian origin.  Right vertebral artery is unremarkable along its course.  Left vertebral artery is occluded at its origin of uncertain  chronicity.  Reconstitution of flow is seen distally within the V2 segment.  Common carotid arteries are patent.  Prominent atherosclerotic plaquing is seen at the bilateral carotid bifurcations resulting in approximately 50% stenosis involving the bilateral internal carotid artery origins.  Bilateral ICAs remain patent distally.    Anterior circulation: The bilateral distal cervical, petrous, cavernous, and supraclinoid ICAs are patent without significant stenosis or aneurysm.  Atherosclerotic plaquing of the cavernous segments of the ICAs.  The anterior and middle cerebral arteries are patent without significant stenosis, occlusion, or aneurysm.    Posterior circulation: There is small caliber vertebrobasilar system.  Bilateral persistent fetal circulation is seen with prominent bilateral posterior communicating arteries supplying flow to the bilateral posterior cerebral arteries.  PCAs show no evidence of focal stenosis, occlusion, or aneurysm.    Airways: Unremarkable.    Glands/Nodes: The parotid, submandibular, and thyroid glands are unremarkable.    Spine: Degenerative changes are seen within the spine, most prominent at the C5-6 and C6-7 levels.    Lungs: Emphysematous changes are seen within the lungs.  Small dependent right pleural effusion versus posterior pleural thickening is seen.                               CT Head Without Contrast (Final result)  Result time 04/29/19 20:16:42    Final result by Willie Butterfield MD (04/29/19 20:16:42)                 Impression:      Advanced involutional change without acute infarct or hemorrhage or mass or fracture.      Electronically signed by: Willie Butterfield  Date:    04/29/2019  Time:    20:16             Narrative:    EXAMINATION:  CT HEAD WITHOUT CONTRAST    CLINICAL HISTORY:  Stroke;    TECHNIQUE:  Low dose axial images were obtained through the head.  Coronal and sagittal reformations were also performed. Contrast was not  administered.    COMPARISON:  None.    FINDINGS:  There is advanced diffuse involutional change with enlargement of ventricles and portion a large amount of the sulci diffusely.  No midline shift or mass effect.  There is moderate diffuse periventricular and deep white matter hypodensity likely advanced microvascular change.  No acute infarct or hemorrhage or mass.  No subdural collection.    The visualized paranasal sinuses and mastoid sinuses and middle ears appear normal.  There is moderate cerumen in the left external external auditory canal and mild cerumen of the right external artery canal.  No skull fracture found.  No overlying soft tissue swelling.  The globes and orbits and lenses appear normal.  Corpus callosum appears grossly normal.  The pituitary and sella turcica appear grossly normal as well.                               X-Ray Chest AP Portable (Final result)  Result time 04/29/19 20:12:14    Final result by Niru Duran MD (04/29/19 20:12:14)                 Impression:      Nonspecific interstitial prominence which may reflect chronic change versus mild pulmonary interstitial edema.      Electronically signed by: Niru Duran MD  Date:    04/29/2019  Time:    20:12             Narrative:    EXAMINATION:  XR CHEST AP PORTABLE    CLINICAL HISTORY:  Stroke;    TECHNIQUE:  Single frontal view of the chest was performed.    COMPARISON:  April 2010.    FINDINGS:  Cardiac silhouette is mildly enlarged but stable in size.  Postsurgical sternotomy changes are seen.  Lungs are symmetrically expanded.  There is nonspecific diffuse interstitial prominence.  No evidence of focal consolidative process, pneumothorax, or significant effusion.  No acute osseous abnormality identified.                               Assessment:     Nicolas Wolff is 88 year old male, with a PMHx of unspecified CHF, HTN and CAD, who presented to ED c/o 4 hours of acute dizziness and noted to have multidirectional nystagmus  of both eyes by physician in ED. Patient has been admitted for observation and neurologic work up.      Plan:     #Dizziness 2/2 vertigo vs TIA   Patient endorsing acute unsteadiness on evening of admission that began while sitting but worsened when standing. Denies positional/postural aggravation but c/o several days of congestion and right ear fullness. Head CT with advanced involutional change without acute findings. CTA showing bilateral ICA and vertebral artery stenosis. ED documented multidirectional nystagmus but resolved by time of our evaluation. Neurologic exam unremarkable, NIH stroke scale 0 and vital signs WNL.  - CTA concerning for posterior circulation cerebral ischemia  - ABCD2 score 3  - tele-stroke consulted   mg and plavix 75 mg given in ED.   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  - started on DAPT as above    #Coronary artery disease  Patient reports a 3v CABG in 2009. Currently does not take any medication consistently.  - ECG with RBBB and left ant fascicular block. No obvious acute ischemic findings  No prior ECGs for comparison  - ASA and HI statin as above  - will need BB prior to discharge  - OP referral for cardiology f/u    Reported HF  CHF listed in chart on problem list, but no ECHO   - does not appear volume overloaded on exam  - TTE ordered for AM    Abnormal ECG  - RBBB and left anterior fascicular block seen as above. No prior ECGs for comparison  - admit with telemetry  - will need referral for close cardiology follow up prior to discharge    Normocytic anemia  H/H 12.5/37.5 MCV 86  - f/u ferritin, iron profile, B12, and  folate    Hypertension  Per chart review. Not taking any medication at home  - normotensive since arrival  - continue to monitor     HCM  - lipid panel, TSH, A1c, anemia panel pending  - will need flu, PNA, and TDAP vaccinations prior to discharge  - needs OP colonoscopy    Code: Full  PPX: SQH  Diet: NPO    Dispo: admit to CDU for observation and neurologic workup. Will need close follow up with neurology and cardiology s/p discharge.     Brenden Hoyos MD  [facility] Internal Medicine HO-1    [facility] Medicine Hospitalist Pager numbers:   [facility] Hospitalist Medicine Team A (Bjorn/Tamar): [phone]  [facility] Hospitalist Medicine Team B (Aaron/Marino):  [phone]

## 2019-04-30 NOTE — PLAN OF CARE
VN note: VN cued into pt's room for introduction. Patient off floor for testing, no family at bedside. VN will continue to be available to patient and intervene prn.       04/30/19 1409   Type of Frequent Check   Type Patient Rounds;Other (see comments)  (VN Rounds, Off floor at this time)   Safety/Activity   Patient Rounds bed in low position

## 2019-04-30 NOTE — PLAN OF CARE
Problem: SLP Goal  Goal: SLP Goal  Short Term Goals:   1. Pt will participate in a bedside swallow study to determine the safest and least restrictive possible po diet with possible updated goals to follow pending results. -MET  2. Pt will participate in a speech, language, and cognitive evaluation with possible updated goals to follow pending results.-MET   Outcome: Outcome(s) achieved Date Met: 04/30/19  Bedside Swallow Study and Illokj-Ttyaqfpl-Sgfvadfzu Evaluation completed. Per. Mr. Wolff, he is at baseline for speech, language, cognition, and swallowing. No overt s/s of aspiration or difficulties swallowing noted. Recommend: regular diet, thin liquids, standard aspiration precautions. No further skilled ST services warranted at this time. Please re-consult as needed.   REMIGIO Miller., CCC-SLP  Spectra: [phone]  04/30/2019

## 2019-05-01 VITALS
DIASTOLIC BLOOD PRESSURE: 69 MMHG | WEIGHT: 154.56 LBS | RESPIRATION RATE: 18 BRPM | BODY MASS INDEX: 23.43 KG/M2 | SYSTOLIC BLOOD PRESSURE: 133 MMHG | OXYGEN SATURATION: 98 % | TEMPERATURE: 96 F | HEART RATE: 97 BPM | HEIGHT: 68 IN

## 2019-05-01 PROBLEM — I25.5 ISCHEMIC CARDIOMYOPATHY: Status: ACTIVE | Noted: 2019-05-01

## 2019-05-01 PROBLEM — I35.0 NONRHEUMATIC AORTIC VALVE STENOSIS: Status: ACTIVE | Noted: 2019-05-01

## 2019-05-01 PROBLEM — I51.9 LV DYSFUNCTION: Status: ACTIVE | Noted: 2019-05-01

## 2019-05-01 PROBLEM — I25.119 CORONARY ARTERY DISEASE INVOLVING NATIVE CORONARY ARTERY OF NATIVE HEART WITH ANGINA PECTORIS: Status: ACTIVE | Noted: 2019-05-01

## 2019-05-01 LAB
ALBUMIN SERPL BCP-MCNC: 3.5 G/DL (ref 3.5–5.2)
ALP SERPL-CCNC: 67 U/L (ref 55–135)
ALT SERPL W/O P-5'-P-CCNC: 13 U/L (ref 10–44)
ANION GAP SERPL CALC-SCNC: 6 MMOL/L (ref 8–16)
AORTIC ROOT ANNULUS: 3.72 CM
AORTIC VALVE CUSP SEPERATION: 0.96 CM
AST SERPL-CCNC: 22 U/L (ref 10–40)
AV INDEX (PROSTH): 0.24
AV MEAN GRADIENT: 10.02 MMHG
AV PEAK GRADIENT: 16.97 MMHG
AV VALVE AREA: 0.91 CM2
AV VELOCITY RATIO: 0.28
BASOPHILS # BLD AUTO: 0.02 K/UL (ref 0–0.2)
BASOPHILS NFR BLD: 0.5 % (ref 0–1.9)
BILIRUB SERPL-MCNC: 0.6 MG/DL (ref 0.1–1)
BSA FOR ECHO PROCEDURE: 1.82 M2
BUN SERPL-MCNC: 11 MG/DL (ref 8–23)
CALCIUM SERPL-MCNC: 9.2 MG/DL (ref 8.7–10.5)
CHLORIDE SERPL-SCNC: 101 MMOL/L (ref 95–110)
CO2 SERPL-SCNC: 26 MMOL/L (ref 23–29)
CREAT SERPL-MCNC: 0.9 MG/DL (ref 0.5–1.4)
CV ECHO LV RWT: 0.38 CM
DIFFERENTIAL METHOD: ABNORMAL
DOP CALC AO PEAK VEL: 2.06 M/S
DOP CALC AO VTI: 45.92 CM
DOP CALC LVOT AREA: 3.87 CM2
DOP CALC LVOT DIAMETER: 2.22 CM
DOP CALC LVOT PEAK VEL: 0.58 M/S
DOP CALC LVOT STROKE VOLUME: 41.9 CM3
DOP CALCLVOT PEAK VEL VTI: 10.83 CM
E WAVE DECELERATION TIME: 180.05 MSEC
E/A RATIO: 0.94
ECHO LV POSTERIOR WALL: 1.1 CM (ref 0.6–1.1)
EOSINOPHIL # BLD AUTO: 0 K/UL (ref 0–0.5)
EOSINOPHIL NFR BLD: 0.5 % (ref 0–8)
ERYTHROCYTE [DISTWIDTH] IN BLOOD BY AUTOMATED COUNT: 15 % (ref 11.5–14.5)
EST. GFR  (AFRICAN AMERICAN): >60 ML/MIN/1.73 M^2
EST. GFR  (NON AFRICAN AMERICAN): >60 ML/MIN/1.73 M^2
FRACTIONAL SHORTENING: 12 % (ref 28–44)
GLUCOSE SERPL-MCNC: 117 MG/DL (ref 70–110)
HCT VFR BLD AUTO: 39 % (ref 40–54)
HGB BLD-MCNC: 12.8 G/DL (ref 14–18)
INTERVENTRICULAR SEPTUM: 1.04 CM (ref 0.6–1.1)
IVRT: 0.07 MSEC
LA MAJOR: 5.82 CM
LA MINOR: 5.12 CM
LA WIDTH: 4.34 CM
LEFT ATRIUM SIZE: 4.65 CM
LEFT ATRIUM VOLUME INDEX: 51.4 ML/M2
LEFT ATRIUM VOLUME: 93.45 CM3
LEFT INTERNAL DIMENSION IN SYSTOLE: 5.06 CM (ref 2.1–4)
LEFT VENTRICLE DIASTOLIC VOLUME INDEX: 89.6 ML/M2
LEFT VENTRICLE DIASTOLIC VOLUME: 163.03 ML
LEFT VENTRICLE MASS INDEX: 138 G/M2
LEFT VENTRICLE SYSTOLIC VOLUME INDEX: 66.9 ML/M2
LEFT VENTRICLE SYSTOLIC VOLUME: 121.8 ML
LEFT VENTRICULAR INTERNAL DIMENSION IN DIASTOLE: 5.75 CM (ref 3.5–6)
LEFT VENTRICULAR MASS: 251.08 G
LYMPHOCYTES # BLD AUTO: 1 K/UL (ref 1–4.8)
LYMPHOCYTES NFR BLD: 25.1 % (ref 18–48)
MCH RBC QN AUTO: 28.4 PG (ref 27–31)
MCHC RBC AUTO-ENTMCNC: 32.8 G/DL (ref 32–36)
MCV RBC AUTO: 87 FL (ref 82–98)
MONOCYTES # BLD AUTO: 0.5 K/UL (ref 0.3–1)
MONOCYTES NFR BLD: 13 % (ref 4–15)
MV PEAK A VEL: 0.64 M/S
MV PEAK E VEL: 0.6 M/S
NEUTROPHILS # BLD AUTO: 2.5 K/UL (ref 1.8–7.7)
NEUTROPHILS NFR BLD: 60.7 % (ref 38–73)
PISA TR MAX VEL: 2.37 M/S
PLATELET # BLD AUTO: 251 K/UL (ref 150–350)
PMV BLD AUTO: 9.8 FL (ref 9.2–12.9)
POTASSIUM SERPL-SCNC: 4.4 MMOL/L (ref 3.5–5.1)
PROT SERPL-MCNC: 6.9 G/DL (ref 6–8.4)
PULM VEIN S/D RATIO: 1.09
PV PEAK D VEL: 0.44 M/S
PV PEAK S VEL: 0.48 M/S
PV PEAK VELOCITY: 0.67 CM/S
RA MAJOR: 5.25 CM
RA PRESSURE: 3 MMHG
RBC # BLD AUTO: 4.51 M/UL (ref 4.6–6.2)
RIGHT VENTRICULAR END-DIASTOLIC DIMENSION: 3.63 CM
SODIUM SERPL-SCNC: 133 MMOL/L (ref 136–145)
TR MAX PG: 22.47 MMHG
TV REST PULMONARY ARTERY PRESSURE: 25 MMHG
WBC # BLD AUTO: 4.14 K/UL (ref 3.9–12.7)

## 2019-05-01 PROCEDURE — 25000003 PHARM REV CODE 250: Performed by: STUDENT IN AN ORGANIZED HEALTH CARE EDUCATION/TRAINING PROGRAM

## 2019-05-01 PROCEDURE — 94761 N-INVAS EAR/PLS OXIMETRY MLT: CPT

## 2019-05-01 PROCEDURE — 96372 THER/PROPH/DIAG INJ SC/IM: CPT

## 2019-05-01 PROCEDURE — 36415 COLL VENOUS BLD VENIPUNCTURE: CPT

## 2019-05-01 PROCEDURE — 80053 COMPREHEN METABOLIC PANEL: CPT

## 2019-05-01 PROCEDURE — 25000003 PHARM REV CODE 250: Performed by: INTERNAL MEDICINE

## 2019-05-01 PROCEDURE — G0378 HOSPITAL OBSERVATION PER HR: HCPCS

## 2019-05-01 PROCEDURE — 85025 COMPLETE CBC W/AUTO DIFF WBC: CPT

## 2019-05-01 PROCEDURE — 63600175 PHARM REV CODE 636 W HCPCS: Performed by: INTERNAL MEDICINE

## 2019-05-01 RX ORDER — GUAIFENESIN 600 MG/1
600 TABLET, EXTENDED RELEASE ORAL ONCE
Status: COMPLETED | OUTPATIENT
Start: 2019-05-01 | End: 2019-05-01

## 2019-05-01 RX ORDER — ATORVASTATIN CALCIUM 40 MG/1
40 TABLET, FILM COATED ORAL DAILY
Qty: 90 TABLET | Refills: 3 | Status: SHIPPED | OUTPATIENT
Start: 2019-05-02 | End: 2020-01-01

## 2019-05-01 RX ORDER — ASPIRIN 81 MG/1
81 TABLET ORAL DAILY
Qty: 60 TABLET | Refills: 5 | Status: SHIPPED | OUTPATIENT
Start: 2019-05-01 | End: 2020-01-01

## 2019-05-01 RX ORDER — LISINOPRIL 2.5 MG/1
2.5 TABLET ORAL DAILY
Qty: 90 TABLET | Refills: 3 | Status: SHIPPED | OUTPATIENT
Start: 2019-05-01 | End: 2019-05-01 | Stop reason: HOSPADM

## 2019-05-01 RX ORDER — CLOPIDOGREL BISULFATE 75 MG/1
75 TABLET ORAL DAILY
Qty: 30 TABLET | Refills: 11 | Status: SHIPPED | OUTPATIENT
Start: 2019-05-02 | End: 2020-01-01

## 2019-05-01 RX ORDER — CARVEDILOL 3.12 MG/1
3.12 TABLET ORAL 2 TIMES DAILY WITH MEALS
Qty: 60 TABLET | Refills: 11 | Status: SHIPPED | OUTPATIENT
Start: 2019-05-01 | End: 2020-01-01

## 2019-05-01 RX ADMIN — CLOPIDOGREL BISULFATE 75 MG: 75 TABLET ORAL at 09:05

## 2019-05-01 RX ADMIN — ATORVASTATIN CALCIUM 40 MG: 40 TABLET, FILM COATED ORAL at 09:05

## 2019-05-01 RX ADMIN — GUAIFENESIN 600 MG: 600 TABLET, EXTENDED RELEASE ORAL at 11:05

## 2019-05-01 RX ADMIN — ASPIRIN 81 MG: 81 TABLET, COATED ORAL at 09:05

## 2019-05-01 RX ADMIN — RAMELTEON 8 MG: 8 TABLET, FILM COATED ORAL at 02:05

## 2019-05-01 RX ADMIN — HEPARIN SODIUM 5000 UNITS: 5000 INJECTION, SOLUTION INTRAVENOUS; SUBCUTANEOUS at 06:05

## 2019-05-01 NOTE — PLAN OF CARE
Problem: Adult Inpatient Plan of Care  Goal: Plan of Care Review  Outcome: Ongoing (interventions implemented as appropriate)  Neuro checks within normal limits, no deficits.  No active heart failure noted.   No syncopal episodes this shift.  No fevers.  Fall precautions explained and maintained.  Bed alarm on.  Telemetry with NSR with 1 st degree AV block.  For possible discharge today.

## 2019-05-01 NOTE — PROGRESS NOTES
The Sw spoke to the team and they're waiting on the results of the echo and will speak with their upper level before proceeding with the d/c for the pt.

## 2019-05-01 NOTE — PROGRESS NOTES
The pt's nurse will call report to [facility] for the pt. The pt's nurse has been given the copied chart.

## 2019-05-01 NOTE — PLAN OF CARE
Problem: Adult Inpatient Plan of Care  Goal: Plan of Care Review  Outcome: Ongoing (interventions implemented as appropriate)  Admitted with TIA.  Neuro checks within normal limits.  Awake and alert.  Requests to be closer to people.  NO syncopal episode.  No fevers.  Telemetry with NSR 1 st degree AV block.  Fall precautions explained and maintained.  Bed alarm on.  Continue with plan of care.

## 2019-05-01 NOTE — PROGRESS NOTES
The Sw met with the pt and he's in agreement with the d/c plan for [facility] today and he's aware he will sign his admit papers when he arrives there. The pt signed the pt choice form and the Sw placed it in the pt's chart.

## 2019-05-01 NOTE — PLAN OF CARE
VN note: VN cued into pt's room for introduction. VN informed pt that VN would be working closely along side bedside nurse, PCT, and the rest of care team and making rounds throughout the shift. Pt verbalized understanding. Allowed time for questions. Patient denies any pain or discomfort at this time.   Patient educated on safety to call before getting up, because we don't want the patient to fall and harm themselves in any way.  VN will continue to be available to patient and intervene prn.        05/01/19 1049   Type of Frequent Check   Type Patient Rounds  (VN rounding)   Safety/Activity   Patient Rounds visualized patient;call light in patient/parent reach   Safety Promotion/Fall Prevention side rails raised x 3   Safety Precautions emergency equipment at bedside   Positioning   Body Position supine   Head of Bed (HOB) HOB at 20-30 degrees   Pain/Comfort/Sleep   Preferred Pain Scale number (Numeric Rating Pain Scale)   Comfort/Acceptable Pain Level 0   Pain Rating (0-10): Rest 0   Pain Rating (0-10): Activity 0        Cardiac/Telemetry Details / Alarms   Cardiac/Telemetry Monitor On Yes   Cardiac/Telemetry Audible Yes   Cardiac/Telemetry Alarms Set Yes   Assessments (Pre/Post)   Level of Consciousness (AVPU) alert

## 2019-05-01 NOTE — PLAN OF CARE
Problem: Adult Inpatient Plan of Care  Goal: Plan of Care Review  Outcome: Ongoing (interventions implemented as appropriate)  Plan of care reviewed with patient. Patient voiced understanding. NSR-ST on monitor, pt sustained in the 120s. Spoke to MD. MD stated that if pt was asymptomatic, it wasn't a concern. No acute distress noted. Side rails x 3, bed in lowest position, call bell within reach, pt advised to call for assistance. Maintain bed alarm for patient safety.

## 2019-05-01 NOTE — NURSING
Dr Hoyos with Dr Pichardo's team advised of the two different B/P on the patient from his right arm to his left.  See flow sheet.

## 2019-05-01 NOTE — PLAN OF CARE
Visited with pt, aware he will be going to [facility] and remains in agreement with plan.  Pt will sign admission paperwork once he arrives at the facility.  Will await d/c order.

## 2019-05-01 NOTE — PROGRESS NOTES
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       Allergies:Review of patient's allergies indicates:  No Known Allergies    Diet: cardiac diet    Activities: Activity as tolerated    Nursing:      Labs:    CONSULTS:    Physical Therapy to evaluate and treat.  and Occupational Therapy to evaluate and treat.    MISCELLANEOUS CARE:      WOUND CARE ORDERS      Medications: Review discharge medications with patient and family and provide education.      Current Discharge Medication List      START taking these medications    Details   aspirin (ECOTRIN) 81 MG EC tablet Take 1 tablet (81 mg total) by mouth once daily.  Qty: 60 tablet, Refills: 5      atorvastatin (LIPITOR) 40 MG tablet Take 1 tablet (40 mg total) by mouth once daily.  Qty: 90 tablet, Refills: 3      carvedilol (COREG) 3.125 MG tablet Take 1 tablet (3.125 mg total) by mouth 2 (two) times daily with meals.  Qty: 60 tablet, Refills: 11      clopidogrel (PLAVIX) 75 mg tablet Take 1 tablet (75 mg total) by mouth once daily.  Qty: 30 tablet, Refills: 11         CONTINUE these medications which have NOT CHANGED    Details   diphenhydrAMINE (BENADRYL) 25 mg capsule Take 25 mg by mouth every 6 (six) hours as needed for Itching.      multivitamin capsule Take 1 capsule by mouth once daily.         STOP taking these medications       aspirin 500 MG tablet Comments:   Reason for Stopping:         diphenhydramine-acetaminophen (TYLENOL PM)   mg Tab Comments:   Reason for Stopping:         aspirin 81 MG Chew Comments:   Reason for Stopping:                    _________________________________  Florian Reyes MD  05/01/2019

## 2019-05-01 NOTE — PLAN OF CARE
05/01/19 1249   Final Note   Assessment Type Final Discharge Note   Anticipated Discharge Disposition Int Care Fac   Right Care Referral Info   Post Acute Recommendation Other  ([facility] nhp)   Referral Type [facility]   [facility]

## 2019-05-01 NOTE — NURSING
The floor nurse requested the VN to contact the doctor.  The patient refused to have his ordered MRI done.  Per the patient he has had it before and he doesn't like it.  Dr Hoyos advised, and stated the team will go and talk to the patient.  The floor nurse advised.

## 2019-05-01 NOTE — PLAN OF CARE
05/01/19 1155   Post-Acute Status   Post-Acute Placement Status Additional Clinical Requested  (the robbin faxed the d/c orders to [facility])

## 2019-05-01 NOTE — NURSING
Report given to SHONDA Shaw at [facility]. Patient transported via wheelchair with van. Patient has his belongings. tele off and returned.

## 2019-05-01 NOTE — PROGRESS NOTES
The Sw spoke to the pt's nurse and arranged the w/c van transport via Epic with [facility] for a 2:30pm w/c van transport. The Sw will give the pt's nurse the contact info to call report along with the copied chart.

## 2019-05-01 NOTE — PROGRESS NOTES
The Sw spoke to Magdalene at [facility] who states the State is currently in her building and she can't leave the facility so she will allow the pt to come to [facility] and will sign the admit papers when he arrives. The Sw asked the team to write the d/c orders.

## 2019-05-01 NOTE — PROGRESS NOTES
The Sw was informed by the pt's nurse the team wants the pt's echo read before he discharges. The Sw called [facility] [phone] spoke to Brandon and pushed the pt's transport back to 4pm. The Sw informed the pt's nurse and gave her the contact info along with the copied chart to call report.

## 2019-05-01 NOTE — PLAN OF CARE
05/01/19 1547   Final Note   Assessment Type Final Discharge Note   Anticipated Discharge Disposition Long Term  ([facility])   Hospital Follow Up  Appt(s) scheduled? Yes   Discharge plans and expectations educations in teach back method with documentation complete? Yes       CM notified pt is ready for d/c.  Per primary team, pt will need f/u with Dr Powell.  Office contacted to schedule f/u appt as first available not until July.  Office will contact [facility] with appt.  [facility]    Transport present to take pt to facility.

## 2019-05-07 NOTE — DISCHARGE SUMMARY
"[facility] Discharge Summary    Primary Team: [facility] Team b  Attending Physician: Kay  Resident: nitesh  Intern: Oma    Date of Admit: 4/29/2019  Date of Discharge: 5/7/2019    Discharge to: [facility] (nursing home)  Condition: Stable     Discharge Diagnoses     Patient Active Problem List   Diagnosis    TIA (transient ischemic attack)    Vertebral artery occlusion, left    Vertigo    Coronary artery disease involving native coronary artery of native heart with angina pectoris    S/P CABG x 5    Stenosis of left subclavian artery    LV dysfunction    Ischemic cardiomyopathy    Nonrheumatic aortic valve stenosis       Consultants and Procedures     Consultants:  Cardiology  Nutrition    Procedures:   none    Brief History of Present Illness      Nicolas Wolff is a 88 y.o.  male who  has a past medical history of CHF (congestive heart failure), Coronary artery disease, Coronary artery disease involving native coronary artery of native heart with angina pectoris (5/1/2019), Hypertension, Ischemic cardiomyopathy (5/1/2019), LV dysfunction (5/1/2019), Nonrheumatic aortic valve stenosis (5/1/2019), S/P CABG x 5 (10/29/2007), and Stenosis of left subclavian artery (10/25/2007).  The patient presented to [facility] on 4/29/2019 with a primary complaint of Dizziness (Dizziness, runny nose, and congested ears that started today)  .     Patient is an exceedingly poor historian.    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 The dizziness is described as "uneasy and off balance". He states he did not feel like the room was spinning and denies any change in vision. At that point, he stood up from the bed but felt uneasy on his feet so he called EMS. He denies any fall or LOC. Patient " "states that he has been experiencing rhinorrhea with congestion and an "ear popping" on the right side for the last few days, but deals with seasonal allergies chronically. He denies any headaches, blurry vision, palpitations, chest pain, fevers, chills, cough, abdominal pain or change in BMs. He has been eating and drinking normally and denies any sick contacts at home.    For the full HPI please refer to the History & Physical from this admission.    Hospital Course By Problem with Pertinent Findings     #Lightheadedness, Suspected 2/2 Vertigo vs TIA   - Acute unsteadiness on evening of admission that began while sitting but worsened when standing. Denied positional/postural aggravation, but c/o several days of congestion and right ear fullness.  Multi-directional nystagmus per ED, but resolved before arrival of hospitalist team  - No FNDs  - NIHSS 0  - ABCD2 score 3  - Head CT with advanced involutional change without acute findings  - CTA H/N showing 50% bilateral ICA stenosis and left vertebral artery occlusion; concerning for posterior circulation cerebral ischemia  - Tele-stroke consulted; ASA 325mg and Plavix 75mg given in ED.   - Telemetry and q4h neuro checks   - Continue DAPT and lipitor 40mg  - MRI brain declined by patient twice over past two days  - TTE revealed severe aortic stenosis with aortic valve area of 0.91; peak velocity of 2.06 and a mean gradient of 10.02. Cardiology saw patient while in patient. Per discussion with [facility] fellow, patient will be scheduled is [facility] cardiology clinic with Dr. Motta to be evaluated for potential TVAR. Message sent to Dr. Powell to expedite process.     #Asymmetric Blood Pressures  - Patient with asymptomatic asymmetic Bps: LUE 82/62 and /62  - Asymptomatic, otherwise HDS, and without CP/SOB, diaphoresis, or syncope  - Low suspicion for aortic dissection at this time. No evidence of aortic dissection on echo. Though, patient has significant aortic stenosis on " echo. Patient to follow up with cardiology at discharge.     #Hypertension  - H/o HTN noted on chart review, but no home meds  - Normotensive since arrival except for occasional intermittent unsustained episodes of asymptomatic hypertension (systolic BP up to 140-150s)  - patient initiated on Coreg 6.25. Titrate as outpatient.      #Bilateral ICA and Left Vertebral Artery Stenosis  - CTA H/N: Occlusion of the left vertebral artery origin of uncertain chronicity and approximately 50% stenosis involving bilateral ICA  - Likely chronic stenoses with low suspicion for significant contribution to patient's symptoms  - Admitted for observation  - MRI ordered, but patient has declined twice over past two days.     #Coronary Artery Disease  - Self-reported 3-vessel CABG in 2009; no home meds  - ECG with RBBB and left ant fascicular block.  Mild troponemia but no evidence for acute ischemia on ECG; likely 2/2 demand.  No prior ECGs for comparison  - ASA 81 & high-intensity statin; will continue at discharge  - Will discharge on coreg.  May receive ACEi in Cardiology Clinic if tolerating BB  - OP referral for [facility] Cardiology clinic.     #Unspecified HF  - CHF noted on chart review, but no echocardiograms in EMR  - No home meds  - No CP/SOB; no evidence of volume overload  - TTE in process  - GDMT as above (will discharge on low-dose BB as patient has remained normotensive; if tolerating well, can have ACEi added at follow-up with [facility]iology     #Abnormal ECG  - RBBB and left anterior fascicular block on ED ECG; no prior ECGs to compare  - Known h/o CABG x3  - Asymptomatic  - Telemetry  - Will refer for close Cardiology follow-up with [facility] prior to discharge     #Normocytic anemia  - H/H 12.5/37.5 with MCV 86 on admit; consistent with H/H from previous labs 5 years ago  - Asymptomatic, HDS, and without signs/symptoms of bleeding  - Folate & vit B12 WNL  - Continue to monitor     #HCM  - Lipid panel WNL  - TSH WNL  - A1c  WNL  - To receive Prevnar & Tdap prior to discharge  - Will need OP colonoscopy  - Placement with [facility]); PPD placed       Discharge Medications        Medication List      START taking these medications    aspirin 81 MG EC tablet  Commonly known as:  ECOTRIN  Take 1 tablet (81 mg total) by mouth once daily.  Replaces:  aspirin 81 MG Chew     atorvastatin 40 MG tablet  Commonly known as:  LIPITOR  Take 1 tablet (40 mg total) by mouth once daily.     carvedilol 3.125 MG tablet  Commonly known as:  COREG  Take 1 tablet (3.125 mg total) by mouth 2 (two) times daily with meals.     clopidogrel 75 mg tablet  Commonly known as:  PLAVIX  Take 1 tablet (75 mg total) by mouth once daily.        CONTINUE taking these medications    diphenhydrAMINE 25 mg capsule  Commonly known as:  BENADRYL     multivitamin capsule        STOP taking these medications    aspirin 500 MG tablet     aspirin 81 MG Chew  Replaced by:  aspirin 81 MG EC tablet     diphenhydramine-acetaminophen  mg Tab  Commonly known as:  TYLENOL PM           Where to Get Your Medications      These medications were sent to [facility] [address], [place] [id number]    Hours:  Mon-Fri, 8a-5:30p Phone:  [phone]   · aspirin 81 MG EC tablet  · atorvastatin 40 MG tablet  · carvedilol 3.125 MG tablet  · clopidogrel 75 mg tablet         Discharge Information:   Diet:  Cardiac    Physical Activity:  As tolerated    Instructions:  1. Take all medications as prescribed  2. Keep all follow-up appointments  3. Return to the hospital or call your primary care physicians if any worsening symptoms such as Fever, chills, nausea, vomiting, abdominal pain, HA, vision changes, CP, SOB, Altered mental status, Loss of consciousness, or any other concerning signs or symptoms occur.      Follow-Up Appointments:  No follow-ups on file.      Florian Reyes  [facility], -II

## 2019-07-26 ENCOUNTER — HOSPITAL ENCOUNTER (INPATIENT)
Facility: HOSPITAL | Age: 84
LOS: 5 days | Discharge: NURSING, REHAB OR LONG-TERM CARE | DRG: 293 | End: 2019-07-31
Attending: EMERGENCY MEDICINE | Admitting: INTERNAL MEDICINE
Payer: MEDICARE

## 2019-07-26 DIAGNOSIS — I50.9 ACUTE ON CHRONIC CONGESTIVE HEART FAILURE, UNSPECIFIED HEART FAILURE TYPE: ICD-10-CM

## 2019-07-26 DIAGNOSIS — I25.10 CORONARY ARTERY DISEASE INVOLVING NATIVE CORONARY ARTERY OF NATIVE HEART WITHOUT ANGINA PECTORIS: ICD-10-CM

## 2019-07-26 DIAGNOSIS — R06.02 SHORTNESS OF BREATH: ICD-10-CM

## 2019-07-26 DIAGNOSIS — I77.1 STENOSIS OF LEFT SUBCLAVIAN ARTERY: ICD-10-CM

## 2019-07-26 DIAGNOSIS — I50.43 ACUTE ON CHRONIC COMBINED SYSTOLIC AND DIASTOLIC CONGESTIVE HEART FAILURE: Primary | ICD-10-CM

## 2019-07-26 DIAGNOSIS — R00.1 BRADYCARDIA: ICD-10-CM

## 2019-07-26 DIAGNOSIS — D64.9 NORMOCYTIC ANEMIA: ICD-10-CM

## 2019-07-26 LAB
ALBUMIN SERPL BCP-MCNC: 3.3 G/DL (ref 3.5–5.2)
ALP SERPL-CCNC: 104 U/L (ref 55–135)
ALT SERPL W/O P-5'-P-CCNC: 21 U/L (ref 10–44)
ANION GAP SERPL CALC-SCNC: 11 MMOL/L (ref 8–16)
AST SERPL-CCNC: 29 U/L (ref 10–40)
BASOPHILS # BLD AUTO: 0.02 K/UL (ref 0–0.2)
BASOPHILS NFR BLD: 0.4 % (ref 0–1.9)
BILIRUB SERPL-MCNC: 0.5 MG/DL (ref 0.1–1)
BNP SERPL-MCNC: 3183 PG/ML (ref 0–99)
BUN SERPL-MCNC: 17 MG/DL (ref 8–23)
CALCIUM SERPL-MCNC: 8.4 MG/DL (ref 8.7–10.5)
CHLORIDE SERPL-SCNC: 105 MMOL/L (ref 95–110)
CO2 SERPL-SCNC: 21 MMOL/L (ref 23–29)
CREAT SERPL-MCNC: 0.8 MG/DL (ref 0.5–1.4)
DIFFERENTIAL METHOD: ABNORMAL
EOSINOPHIL # BLD AUTO: 0.1 K/UL (ref 0–0.5)
EOSINOPHIL NFR BLD: 1.6 % (ref 0–8)
ERYTHROCYTE [DISTWIDTH] IN BLOOD BY AUTOMATED COUNT: 18.1 % (ref 11.5–14.5)
EST. GFR  (AFRICAN AMERICAN): >60 ML/MIN/1.73 M^2
EST. GFR  (NON AFRICAN AMERICAN): >60 ML/MIN/1.73 M^2
GLUCOSE SERPL-MCNC: 107 MG/DL (ref 70–110)
HCT VFR BLD AUTO: 31.2 % (ref 40–54)
HGB BLD-MCNC: 10.2 G/DL (ref 14–18)
INR PPP: 1.1 (ref 0.8–1.2)
LYMPHOCYTES # BLD AUTO: 1 K/UL (ref 1–4.8)
LYMPHOCYTES NFR BLD: 18.2 % (ref 18–48)
MCH RBC QN AUTO: 28.4 PG (ref 27–31)
MCHC RBC AUTO-ENTMCNC: 32.7 G/DL (ref 32–36)
MCV RBC AUTO: 87 FL (ref 82–98)
MONOCYTES # BLD AUTO: 0.6 K/UL (ref 0.3–1)
MONOCYTES NFR BLD: 11.2 % (ref 4–15)
NEUTROPHILS # BLD AUTO: 3.9 K/UL (ref 1.8–7.7)
NEUTROPHILS NFR BLD: 68.6 % (ref 38–73)
PLATELET # BLD AUTO: 207 K/UL (ref 150–350)
PMV BLD AUTO: 10.3 FL (ref 9.2–12.9)
POTASSIUM SERPL-SCNC: 4.3 MMOL/L (ref 3.5–5.1)
PROT SERPL-MCNC: 6.3 G/DL (ref 6–8.4)
PROTHROMBIN TIME: 11 SEC (ref 9–12.5)
RBC # BLD AUTO: 3.59 M/UL (ref 4.6–6.2)
SODIUM SERPL-SCNC: 137 MMOL/L (ref 136–145)
TROPONIN I SERPL DL<=0.01 NG/ML-MCNC: 0.03 NG/ML (ref 0–0.03)
WBC # BLD AUTO: 5.7 K/UL (ref 3.9–12.7)

## 2019-07-26 PROCEDURE — 25000242 PHARM REV CODE 250 ALT 637 W/ HCPCS: Performed by: EMERGENCY MEDICINE

## 2019-07-26 PROCEDURE — 85025 COMPLETE CBC W/AUTO DIFF WBC: CPT

## 2019-07-26 PROCEDURE — 99285 EMERGENCY DEPT VISIT HI MDM: CPT | Mod: 25

## 2019-07-26 PROCEDURE — 84484 ASSAY OF TROPONIN QUANT: CPT

## 2019-07-26 PROCEDURE — 96372 THER/PROPH/DIAG INJ SC/IM: CPT | Mod: 59

## 2019-07-26 PROCEDURE — 25000003 PHARM REV CODE 250: Performed by: STUDENT IN AN ORGANIZED HEALTH CARE EDUCATION/TRAINING PROGRAM

## 2019-07-26 PROCEDURE — 94640 AIRWAY INHALATION TREATMENT: CPT

## 2019-07-26 PROCEDURE — 11000001 HC ACUTE MED/SURG PRIVATE ROOM

## 2019-07-26 PROCEDURE — 63600175 PHARM REV CODE 636 W HCPCS: Performed by: STUDENT IN AN ORGANIZED HEALTH CARE EDUCATION/TRAINING PROGRAM

## 2019-07-26 PROCEDURE — 83880 ASSAY OF NATRIURETIC PEPTIDE: CPT

## 2019-07-26 PROCEDURE — G0378 HOSPITAL OBSERVATION PER HR: HCPCS

## 2019-07-26 PROCEDURE — 80053 COMPREHEN METABOLIC PANEL: CPT

## 2019-07-26 PROCEDURE — 93005 ELECTROCARDIOGRAM TRACING: CPT

## 2019-07-26 PROCEDURE — 96375 TX/PRO/DX INJ NEW DRUG ADDON: CPT

## 2019-07-26 PROCEDURE — 63600175 PHARM REV CODE 636 W HCPCS: Performed by: EMERGENCY MEDICINE

## 2019-07-26 PROCEDURE — 96374 THER/PROPH/DIAG INJ IV PUSH: CPT

## 2019-07-26 PROCEDURE — 25000003 PHARM REV CODE 250: Performed by: EMERGENCY MEDICINE

## 2019-07-26 PROCEDURE — 85610 PROTHROMBIN TIME: CPT

## 2019-07-26 RX ORDER — FUROSEMIDE 40 MG/1
40 TABLET ORAL 2 TIMES DAILY
Status: ON HOLD | COMMUNITY
End: 2019-07-29 | Stop reason: SDUPTHER

## 2019-07-26 RX ORDER — TRAZODONE HYDROCHLORIDE 50 MG/1
50 TABLET ORAL NIGHTLY
COMMUNITY

## 2019-07-26 RX ORDER — LORATADINE 10 MG
10 TABLET,DISINTEGRATING ORAL DAILY
COMMUNITY

## 2019-07-26 RX ORDER — SODIUM CHLORIDE 0.9 % (FLUSH) 0.9 %
10 SYRINGE (ML) INJECTION
Status: DISCONTINUED | OUTPATIENT
Start: 2019-07-26 | End: 2019-07-31 | Stop reason: HOSPADM

## 2019-07-26 RX ORDER — ASPIRIN 81 MG/1
81 TABLET ORAL DAILY
Status: DISCONTINUED | OUTPATIENT
Start: 2019-07-27 | End: 2019-07-31 | Stop reason: HOSPADM

## 2019-07-26 RX ORDER — CETIRIZINE HYDROCHLORIDE 10 MG/1
10 TABLET ORAL DAILY
Status: DISCONTINUED | OUTPATIENT
Start: 2019-07-27 | End: 2019-07-31 | Stop reason: HOSPADM

## 2019-07-26 RX ORDER — FLUTICASONE PROPIONATE 50 MCG
1 SPRAY, SUSPENSION (ML) NASAL DAILY
Status: DISCONTINUED | OUTPATIENT
Start: 2019-07-27 | End: 2019-07-31 | Stop reason: HOSPADM

## 2019-07-26 RX ORDER — ACETAMINOPHEN 325 MG/1
650 TABLET ORAL EVERY 8 HOURS PRN
Status: DISCONTINUED | OUTPATIENT
Start: 2019-07-26 | End: 2019-07-31 | Stop reason: HOSPADM

## 2019-07-26 RX ORDER — ONDANSETRON 2 MG/ML
4 INJECTION INTRAMUSCULAR; INTRAVENOUS EVERY 8 HOURS PRN
Status: DISCONTINUED | OUTPATIENT
Start: 2019-07-26 | End: 2019-07-31 | Stop reason: HOSPADM

## 2019-07-26 RX ORDER — FLUTICASONE PROPIONATE 50 MCG
1 SPRAY, SUSPENSION (ML) NASAL DAILY
COMMUNITY

## 2019-07-26 RX ORDER — POTASSIUM CHLORIDE 750 MG/1
10 CAPSULE, EXTENDED RELEASE ORAL ONCE
COMMUNITY

## 2019-07-26 RX ORDER — ATORVASTATIN CALCIUM 40 MG/1
40 TABLET, FILM COATED ORAL DAILY
Status: DISCONTINUED | OUTPATIENT
Start: 2019-07-27 | End: 2019-07-31 | Stop reason: HOSPADM

## 2019-07-26 RX ORDER — FLUCONAZOLE 200 MG/1
100 TABLET ORAL DAILY
Status: ON HOLD | COMMUNITY
End: 2019-07-29 | Stop reason: HOSPADM

## 2019-07-26 RX ORDER — NYSTATIN 100MM UNIT
POWDER (EA) MISCELLANEOUS
COMMUNITY

## 2019-07-26 RX ORDER — CLOPIDOGREL BISULFATE 75 MG/1
75 TABLET ORAL DAILY
Status: DISCONTINUED | OUTPATIENT
Start: 2019-07-27 | End: 2019-07-31 | Stop reason: HOSPADM

## 2019-07-26 RX ORDER — FUROSEMIDE 10 MG/ML
80 INJECTION INTRAMUSCULAR; INTRAVENOUS 2 TIMES DAILY
Status: DISCONTINUED | OUTPATIENT
Start: 2019-07-27 | End: 2019-07-28

## 2019-07-26 RX ORDER — FUROSEMIDE 10 MG/ML
80 INJECTION INTRAMUSCULAR; INTRAVENOUS
Status: COMPLETED | OUTPATIENT
Start: 2019-07-26 | End: 2019-07-26

## 2019-07-26 RX ORDER — ENOXAPARIN SODIUM 100 MG/ML
40 INJECTION SUBCUTANEOUS EVERY 24 HOURS
Status: DISCONTINUED | OUTPATIENT
Start: 2019-07-26 | End: 2019-07-31 | Stop reason: HOSPADM

## 2019-07-26 RX ORDER — IPRATROPIUM BROMIDE AND ALBUTEROL SULFATE 2.5; .5 MG/3ML; MG/3ML
3 SOLUTION RESPIRATORY (INHALATION)
Status: COMPLETED | OUTPATIENT
Start: 2019-07-26 | End: 2019-07-26

## 2019-07-26 RX ORDER — DOXYCYCLINE 100 MG/1
100 CAPSULE ORAL EVERY 12 HOURS
Status: ON HOLD | COMMUNITY
End: 2019-07-29 | Stop reason: HOSPADM

## 2019-07-26 RX ORDER — METHYLPREDNISOLONE SOD SUCC 125 MG
125 VIAL (EA) INJECTION
Status: COMPLETED | OUTPATIENT
Start: 2019-07-26 | End: 2019-07-26

## 2019-07-26 RX ORDER — SODIUM CHLORIDE 0.9 % (FLUSH) 0.9 %
3 SYRINGE (ML) INJECTION
Status: DISCONTINUED | OUTPATIENT
Start: 2019-07-26 | End: 2019-07-31 | Stop reason: HOSPADM

## 2019-07-26 RX ADMIN — METHYLPREDNISOLONE SODIUM SUCCINATE 125 MG: 125 INJECTION, POWDER, FOR SOLUTION INTRAMUSCULAR; INTRAVENOUS at 07:07

## 2019-07-26 RX ADMIN — NITROGLYCERIN 1 INCH: 20 OINTMENT TOPICAL at 07:07

## 2019-07-26 RX ADMIN — FUROSEMIDE 80 MG: 10 INJECTION, SOLUTION INTRAVENOUS at 07:07

## 2019-07-26 RX ADMIN — IPRATROPIUM BROMIDE AND ALBUTEROL SULFATE 3 ML: .5; 3 SOLUTION RESPIRATORY (INHALATION) at 07:07

## 2019-07-26 RX ADMIN — TRAZODONE HYDROCHLORIDE 25 MG: 50 TABLET ORAL at 11:07

## 2019-07-26 RX ADMIN — ENOXAPARIN SODIUM 40 MG: 100 INJECTION SUBCUTANEOUS at 11:07

## 2019-07-26 NOTE — ED PROVIDER NOTES
Encounter Date: 7/26/2019    SCRIBE #1 NOTE: I, George Marino, am scribing for, and in the presence of,  . I have scribed the entire note.       History     Chief Complaint   Patient presents with    Shortness of Breath     with 20lb wt gain, seen by NP, denies chest pain, intermittent wheezing     CHIEF COMPLAINT: Patient presents with:  Shortness of Breath      HISTORY OF PRESENT ILLNESS: Nicolas Wolff who is a 88 y.o. presents to the emergency department today with complaint of left leg swelling and redness. Patient states his leg becomes inflamed and red without a cause. He also has SOB with cough and wheezing, worse with movement. The patient states he walks with a walker. Patient denies fever, chills, sore throat, cough, CP, nausea, vomiting, dysuria, urinary frequency, urgency, back pain, wounds, LOC, weakness/numbness, easy bruising.           ALLERGIES REVIEWED  MEDICATIONS REVIEWED  PMH/PSH/SOC/FH REVIEWED     The history is provided by the patient.    Nursing/Ancillary staff note reviewed.        The history is provided by the patient.     Review of patient's allergies indicates:  No Known Allergies  Past Medical History:   Diagnosis Date    CHF (congestive heart failure)     Coronary artery disease     Coronary artery disease involving native coronary artery of native heart with angina pectoris 5/1/2019    Hypertension     Ischemic cardiomyopathy 5/1/2019    LVEF of 25% on 4/29/19.   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  No LIMA to LAD graft due to 80% left subclavian artery stenosis.    Stenosis of left subclavian artery 10/25/2007     Past Surgical History:   Procedure Laterality Date    BACK SURGERY      CORONARY ARTERY BYPASS GRAFT       History reviewed. No pertinent family history.  Social History     Tobacco Use    Smoking status:  Former Smoker    Smokeless tobacco: Never Used   Substance Use Topics    Alcohol use: No    Drug use: No     Review of Systems   Constitutional: Negative for activity change, chills, diaphoresis and fever.   HENT: Negative for congestion, drooling, ear pain, rhinorrhea, sneezing, sore throat and trouble swallowing.    Eyes: Negative for pain.   Respiratory: Positive for cough, shortness of breath and wheezing. Negative for chest tightness and stridor.    Cardiovascular: Negative for chest pain, palpitations and leg swelling.   Gastrointestinal: Negative for abdominal distention, abdominal pain, constipation, diarrhea, nausea and vomiting.   Genitourinary: Negative for difficulty urinating, dysuria, frequency and urgency.   Musculoskeletal: Negative for arthralgias, back pain, myalgias, neck pain and neck stiffness.   Skin: Positive for color change. Negative for pallor, rash and wound.   Neurological: Negative for dizziness, syncope, weakness, light-headedness, numbness and headaches.   All other systems reviewed and are negative.      Physical Exam     Initial Vitals [07/26/19 1849]   BP Pulse Resp Temp SpO2   (!) 175/75 (!) 54 12 98.7 °F (37.1 °C) 98 %      MAP       --         Physical Exam    Nursing note and vitals reviewed.  Constitutional: He appears well-developed and well-nourished. He is not diaphoretic. No distress.   HENT:   Head: Normocephalic and atraumatic.   Nose: Nose normal.   Mouth/Throat: Oropharynx is clear and moist.   Eyes: Conjunctivae and EOM are normal. Pupils are equal, round, and reactive to light. No scleral icterus.   Neck: Normal range of motion. Neck supple. No JVD present.   Cardiovascular: Normal rate, regular rhythm and normal heart sounds. Exam reveals no gallop and no friction rub.    No murmur heard.  Pulmonary/Chest: No stridor. No respiratory distress. He has decreased breath sounds. He has wheezes in the right lower field and the left lower field. He exhibits no  tenderness.   Abdominal: Soft. Bowel sounds are normal. He exhibits no distension and no mass. There is no tenderness. There is no rebound and no guarding.   Musculoskeletal: Normal range of motion. He exhibits edema. He exhibits no tenderness.        Cervical back: Normal.        Thoracic back: Normal.        Lumbar back: Normal.   4+ pitting edema up the legs bilaterally  1+ in the thighs bilaterally   Lymphadenopathy:     He has no cervical adenopathy.   Neurological: He is alert and oriented to person, place, and time. He has normal strength. No cranial nerve deficit.   Skin: Skin is warm and dry. No rash noted. No pallor.   bilateral skin changes from PVD  No warmth to skin changes, no open lesions or wounds   Psychiatric: He has a normal mood and affect. Thought content normal.         ED Course   Procedures  Labs Reviewed   CBC W/ AUTO DIFFERENTIAL - Abnormal; Notable for the following components:       Result Value    RBC 3.59 (*)     Hemoglobin 10.2 (*)     Hematocrit 31.2 (*)     RDW 18.1 (*)     All other components within normal limits   COMPREHENSIVE METABOLIC PANEL - Abnormal; Notable for the following components:    CO2 21 (*)     Calcium 8.4 (*)     Albumin 3.3 (*)     All other components within normal limits   TROPONIN I - Abnormal; Notable for the following components:    Troponin I 0.029 (*)     All other components within normal limits   B-TYPE NATRIURETIC PEPTIDE - Abnormal; Notable for the following components:    BNP 3,183 (*)     All other components within normal limits   PROTIME-INR     EKG Readings: (Independently Interpreted)   60bpm  SR with 1st degree AV block  premature atrial complexes  bifascicular block  Similar to previous 4/2019       Imaging Results          X-Ray Chest AP Portable (Final result)  Result time 07/26/19 20:19:00    Final result by Rasta Lagos MD (07/26/19 20:19:00)                 Impression:      Cardiomegaly with bilateral pleural effusions, pulmonary vascular  congestion, and bibasilar airspace opacities, most consistent with decompensated CHF.      Electronically signed by: Rasta Lagos MD  Date:    07/26/2019  Time:    20:19             Narrative:    EXAMINATION:  XR CHEST AP PORTABLE    CLINICAL HISTORY:  CHF;    TECHNIQUE:  Single frontal view of the chest was performed.    COMPARISON:  04/29/2019.    FINDINGS:  Monitoring EKG leads are present.  There are postoperative changes of prior median sternotomy.  The trachea is unremarkable.  There is stable enlargement of the cardiomediastinal silhouette.  There is no evidence of free air beneath the hemidiaphragms.  There are new moderate bilateral pleural effusions.  There is no evidence of a pneumothorax.  There is no evidence of pneumomediastinum.  There is pulmonary vascular congestion.  There are bibasilar airspace opacities.  There are extensive degenerative changes in the osseous structures.                                 Medical Decision Making:   Initial Assessment:    Nicolas Wolff 88 y.o. presents to the ED today with shortness of breath. Pt has various risk factors.  Will obtain labs, EKG, CXR to further evaluate. Treat symptoms appropriately and reassess.     Differential Diagnosis:   Pulmonary infectious process, COPD, asthma, pulmonary embolus and congestive heart failure.                     ED Course as of Jul 26 2056 Fri Jul 26, 2019   2008 Pt care turned over to Dr Celaya at shift change awaiting results of labs. Admission for his CHF is likely.     [JA]      ED Course User Index  [JA] Ousmane Farias MD     Clinical Impression:       ICD-10-CM ICD-9-CM   1. Acute on chronic congestive heart failure, unspecified heart failure type I50.9 428.0   2. Shortness of breath R06.02 786.05     Received care from previous physician awaiting study results.  Patient with history of CHF, presents with shortness of breath and weight gain. Plan to admit for diuresis if studies confirm working  diagnosis.    2045:  Studies consistent with CHF exacerbation.  Hospitalist paged for admission.                             Brenden Celaya MD  07/26/19 2056

## 2019-07-27 PROBLEM — R06.02 SHORTNESS OF BREATH: Status: ACTIVE | Noted: 2019-07-27

## 2019-07-27 PROBLEM — I10 HYPERTENSION: Status: ACTIVE | Noted: 2019-07-27

## 2019-07-27 LAB
ANION GAP SERPL CALC-SCNC: 11 MMOL/L (ref 8–16)
BASOPHILS # BLD AUTO: 0 K/UL (ref 0–0.2)
BASOPHILS # BLD AUTO: 0 K/UL (ref 0–0.2)
BASOPHILS NFR BLD: 0 % (ref 0–1.9)
BASOPHILS NFR BLD: 0 % (ref 0–1.9)
BUN SERPL-MCNC: 16 MG/DL (ref 8–23)
CALCIUM SERPL-MCNC: 8.7 MG/DL (ref 8.7–10.5)
CHLORIDE SERPL-SCNC: 101 MMOL/L (ref 95–110)
CO2 SERPL-SCNC: 24 MMOL/L (ref 23–29)
CREAT SERPL-MCNC: 0.9 MG/DL (ref 0.5–1.4)
DIFFERENTIAL METHOD: ABNORMAL
DIFFERENTIAL METHOD: ABNORMAL
EOSINOPHIL # BLD AUTO: 0 K/UL (ref 0–0.5)
EOSINOPHIL # BLD AUTO: 0 K/UL (ref 0–0.5)
EOSINOPHIL NFR BLD: 0 % (ref 0–8)
EOSINOPHIL NFR BLD: 0 % (ref 0–8)
ERYTHROCYTE [DISTWIDTH] IN BLOOD BY AUTOMATED COUNT: 19.4 % (ref 11.5–14.5)
ERYTHROCYTE [DISTWIDTH] IN BLOOD BY AUTOMATED COUNT: 19.4 % (ref 11.5–14.5)
EST. GFR  (AFRICAN AMERICAN): >60 ML/MIN/1.73 M^2
EST. GFR  (NON AFRICAN AMERICAN): >60 ML/MIN/1.73 M^2
FERRITIN SERPL-MCNC: 105 NG/ML (ref 20–300)
FOLATE SERPL-MCNC: 14.4 NG/ML (ref 4–24)
GLUCOSE SERPL-MCNC: 114 MG/DL (ref 70–110)
HCT VFR BLD AUTO: 31.3 % (ref 40–54)
HCT VFR BLD AUTO: 31.3 % (ref 40–54)
HGB BLD-MCNC: 10.1 G/DL (ref 14–18)
HGB BLD-MCNC: 10.1 G/DL (ref 14–18)
IRON SERPL-MCNC: 35 UG/DL (ref 45–160)
LYMPHOCYTES # BLD AUTO: 0.5 K/UL (ref 1–4.8)
LYMPHOCYTES # BLD AUTO: 0.5 K/UL (ref 1–4.8)
LYMPHOCYTES NFR BLD: 13.5 % (ref 18–48)
LYMPHOCYTES NFR BLD: 13.5 % (ref 18–48)
MAGNESIUM SERPL-MCNC: 1.7 MG/DL (ref 1.6–2.6)
MCH RBC QN AUTO: 28.2 PG (ref 27–31)
MCH RBC QN AUTO: 28.2 PG (ref 27–31)
MCHC RBC AUTO-ENTMCNC: 32.3 G/DL (ref 32–36)
MCHC RBC AUTO-ENTMCNC: 32.3 G/DL (ref 32–36)
MCV RBC AUTO: 87 FL (ref 82–98)
MCV RBC AUTO: 87 FL (ref 82–98)
MONOCYTES # BLD AUTO: 0 K/UL (ref 0.3–1)
MONOCYTES # BLD AUTO: 0 K/UL (ref 0.3–1)
MONOCYTES NFR BLD: 0.9 % (ref 4–15)
MONOCYTES NFR BLD: 0.9 % (ref 4–15)
NEUTROPHILS # BLD AUTO: 2.9 K/UL (ref 1.8–7.7)
NEUTROPHILS # BLD AUTO: 2.9 K/UL (ref 1.8–7.7)
NEUTROPHILS NFR BLD: 85.6 % (ref 38–73)
NEUTROPHILS NFR BLD: 85.6 % (ref 38–73)
PLATELET # BLD AUTO: 215 K/UL (ref 150–350)
PLATELET # BLD AUTO: 215 K/UL (ref 150–350)
PMV BLD AUTO: 11.7 FL (ref 9.2–12.9)
PMV BLD AUTO: 11.7 FL (ref 9.2–12.9)
POTASSIUM SERPL-SCNC: 4.2 MMOL/L (ref 3.5–5.1)
RBC # BLD AUTO: 3.58 M/UL (ref 4.6–6.2)
RBC # BLD AUTO: 3.58 M/UL (ref 4.6–6.2)
SATURATED IRON: 12 % (ref 20–50)
SODIUM SERPL-SCNC: 136 MMOL/L (ref 136–145)
TOTAL IRON BINDING CAPACITY: 292 UG/DL (ref 250–450)
TRANSFERRIN SERPL-MCNC: 197 MG/DL (ref 200–375)
TROPONIN I SERPL DL<=0.01 NG/ML-MCNC: 0.04 NG/ML (ref 0–0.03)
VIT B12 SERPL-MCNC: 710 PG/ML (ref 210–950)
WBC # BLD AUTO: 3.4 K/UL (ref 3.9–12.7)
WBC # BLD AUTO: 3.4 K/UL (ref 3.9–12.7)

## 2019-07-27 PROCEDURE — 25000003 PHARM REV CODE 250: Performed by: STUDENT IN AN ORGANIZED HEALTH CARE EDUCATION/TRAINING PROGRAM

## 2019-07-27 PROCEDURE — 84484 ASSAY OF TROPONIN QUANT: CPT

## 2019-07-27 PROCEDURE — 85025 COMPLETE CBC W/AUTO DIFF WBC: CPT

## 2019-07-27 PROCEDURE — 96376 TX/PRO/DX INJ SAME DRUG ADON: CPT

## 2019-07-27 PROCEDURE — 36415 COLL VENOUS BLD VENIPUNCTURE: CPT

## 2019-07-27 PROCEDURE — 96372 THER/PROPH/DIAG INJ SC/IM: CPT

## 2019-07-27 PROCEDURE — 63600175 PHARM REV CODE 636 W HCPCS: Performed by: STUDENT IN AN ORGANIZED HEALTH CARE EDUCATION/TRAINING PROGRAM

## 2019-07-27 PROCEDURE — 27000221 HC OXYGEN, UP TO 24 HOURS

## 2019-07-27 PROCEDURE — 82746 ASSAY OF FOLIC ACID SERUM: CPT

## 2019-07-27 PROCEDURE — 83735 ASSAY OF MAGNESIUM: CPT

## 2019-07-27 PROCEDURE — 83540 ASSAY OF IRON: CPT

## 2019-07-27 PROCEDURE — 11000001 HC ACUTE MED/SURG PRIVATE ROOM

## 2019-07-27 PROCEDURE — 82607 VITAMIN B-12: CPT

## 2019-07-27 PROCEDURE — 94761 N-INVAS EAR/PLS OXIMETRY MLT: CPT

## 2019-07-27 PROCEDURE — 80048 BASIC METABOLIC PNL TOTAL CA: CPT

## 2019-07-27 PROCEDURE — 93005 ELECTROCARDIOGRAM TRACING: CPT

## 2019-07-27 PROCEDURE — 82728 ASSAY OF FERRITIN: CPT

## 2019-07-27 RX ORDER — LISINOPRIL 20 MG/1
20 TABLET ORAL DAILY
Status: DISCONTINUED | OUTPATIENT
Start: 2019-07-27 | End: 2019-07-31

## 2019-07-27 RX ADMIN — LISINOPRIL 20 MG: 20 TABLET ORAL at 06:07

## 2019-07-27 RX ADMIN — FUROSEMIDE 80 MG: 10 INJECTION, SOLUTION INTRAMUSCULAR; INTRAVENOUS at 08:07

## 2019-07-27 RX ADMIN — FUROSEMIDE 80 MG: 10 INJECTION, SOLUTION INTRAMUSCULAR; INTRAVENOUS at 05:07

## 2019-07-27 RX ADMIN — CETIRIZINE HYDROCHLORIDE 10 MG: 10 TABLET, FILM COATED ORAL at 08:07

## 2019-07-27 RX ADMIN — CLOPIDOGREL BISULFATE 75 MG: 75 TABLET ORAL at 08:07

## 2019-07-27 RX ADMIN — ATORVASTATIN CALCIUM 40 MG: 40 TABLET, FILM COATED ORAL at 08:07

## 2019-07-27 RX ADMIN — ASPIRIN 81 MG: 81 TABLET, COATED ORAL at 08:07

## 2019-07-27 RX ADMIN — TRAZODONE HYDROCHLORIDE 25 MG: 50 TABLET ORAL at 08:07

## 2019-07-27 RX ADMIN — ENOXAPARIN SODIUM 40 MG: 100 INJECTION SUBCUTANEOUS at 10:07

## 2019-07-27 NOTE — PLAN OF CARE
Problem: Skin Injury Risk Increased  Goal: Skin Health and Integrity    Intervention: Optimize Skin Protection  Patient arrived from ER late night time but slept well throughout remaining shift, voiding per urinal. AAOx4. Bilateral lower extremities elevated and on pillow.  Slight weeping noted from both legs with +4 edema. Blood pressures somewhat elevated during shift.  Lisinopril 20mg administered at 0630.

## 2019-07-27 NOTE — PLAN OF CARE
07/27/19 1625   Discharge Assessment   Assessment Type Discharge Planning Assessment   Confirmed/corrected address and phone number on facesheet? Yes   Assessment information obtained from? Patient   Prior to hospitilization cognitive status: Alert/Oriented   Prior to hospitalization functional status: Assistive Equipment;Needs Assistance   Current cognitive status: Alert/Oriented   Current Functional Status: Assistive Equipment;Needs Assistance   Facility Arrived From: [facility]   Lives With facility resident   Able to Return to Prior Arrangements yes   Is patient able to care for self after discharge? No   Patient's perception of discharge disposition longterm care facility   Readmission Within the Last 30 Days no previous admission in last 30 days   Patient currently being followed by outpatient case management? No   Patient currently receives any other outside agency services? No   Equipment Currently Used at Home walker, rolling;wheelchair;hospital bed   Do you have any problems affording any of your prescribed medications? No   Is the patient taking medications as prescribed? yes   Does the patient have transportation home? Yes   Transportation Anticipated agency   Does the patient receive services at the [facility] Clinic? No   Discharge Plan A Return to nursing home   DME Needed Upon Discharge  none   Patient/Family in Agreement with Plan yes     Chief Complaint and Duration      SOB x 3 days      Subjective:      History of Present Illness:  Nicolas Wolff is a 88 y.o.  male with a significant  past medical history of CHF (congestive heart failure), Coronary artery disease S/P CABG x 5 (10/29/2007), who presents to the ED on 7/26/2019 from [facility] complaining of SOB that has been going on for the last 2-3 days.  He reports that for the last month both his legs have been red and getting swollen.  He reports that during this time at [facility], he has been given antibiotics for presumed  skin infection in his legs. Pt denies fever chills, chest pain, nausea, vomiting.  Reports that the SOB is non progressive, has not gotten better or worse, and is associated with a non productive cough that started 1 day ago.  He denies eating a more saltier diet.  Denies drinking more fluids than normal.  Denies having any problems with sleeping flat. Reports that his leg swell more when he is sitting in wheel chair.       The Sw met with the pt to complete the assessment. The pt states he has been a resident at [facility] for the past 4 months. The pt states he ambulates with a rolling walker and uses the w/c when he's eating. The pt states prior to that he lived at [facility]. The pt states he has family all over but doesn't keep in contact with any of them. He states his daughter Linda got  to a  and moved away to [place] and he doesn't hear from her. The pt states he's an loner and has been by his self a lot prior to going to [facility]. The pt states he wants to return to [facility] at d/c. The Sw wrote her contact info on the white board in the pt's room and gave him a d/c brochure. The Sw encouraged him to call should he have any further questions or concerns.

## 2019-07-27 NOTE — NURSING
Spoke with [facility]Marino's team re: Heart rate sustaining in the low 40's. Stated they are aware and will also be rounding on the patient soon. Will continue to monitor.

## 2019-07-27 NOTE — ED TRIAGE NOTES
Pt seen in the ED after he was brought via EMS due to increased VIVIANA lower extremity swelling. Per pt report, swelling has increasingly been getting worse for the past two weeks. Pts legs show redness between the area bellow the knee and his ankles reaching the whole perimeter of the calves. Pt denies any pain , or SOB during time of assessment. NO pressure injuries noted during time of assessment, pt ambulates via walker at home and voids at will. Will continue to monitor.

## 2019-07-27 NOTE — H&P
[facility] History and Physical - Resident HO-I Note    Admitting Team: [facility] Team B   Attending Physician: Marino   Resident: Clay   Interns: Janet Mac     Date of Admit: 7/26/2019    Chief Complaint and Duration     SOB x 3 days     Subjective:      History of Present Illness:  Nicolas Wolff is a 88 y.o.  male with a significant  past medical history of CHF (congestive heart failure), Coronary artery disease S/P CABG x 5 (10/29/2007), who presents to the ED on 7/26/2019 from [facility] complaining of SOB that has been going on for the last 2-3 days.  He reports that for the last month both his legs have been red and getting swollen.  He reports that during this time at [facility], he has been given antibiotics for presumed skin infection in his legs. Pt denies fever chills, chest pain, nausea, vomiting.  Reports that the SOB is non progressive, has not gotten better or worse, and is associated with a non productive cough that started 1 day ago.  He denies eating a more saltier diet.  Denies drinking more fluids than normal.  Denies having any problems with sleeping flat. Reports that his leg swell more when he is sitting in wheel chair.          Past Medical History:  Past Medical History:   Diagnosis Date    CHF (congestive heart failure)     Coronary artery disease     Coronary artery disease involving native coronary artery of native heart with angina pectoris 5/1/2019    Hypertension     Ischemic cardiomyopathy 5/1/2019    LVEF of 25% on 4/29/19.   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  No LIMA to LAD graft due to 80% left subclavian artery stenosis.    Stenosis of left subclavian artery 10/25/2007       Past Surgical History:  Past Surgical History:   Procedure Laterality Date    BACK SURGERY      CORONARY ARTERY BYPASS  GRAFT         Allergies:  Review of patient's allergies indicates:  No Known Allergies    Home Medications:  Prior to Admission medications    Medication Sig Start Date End Date Taking? Authorizing Provider   aspirin (ECOTRIN) 81 MG EC tablet Take 1 tablet (81 mg total) by mouth once daily. 5/1/19 4/30/20 Yes Florian Reyes MD   atorvastatin (LIPITOR) 40 MG tablet Take 1 tablet (40 mg total) by mouth once daily. 5/2/19 5/1/20 Yes Florian Reyes MD   carvedilol (COREG) 3.125 MG tablet Take 1 tablet (3.125 mg total) by mouth 2 (two) times daily with meals. 5/1/19 4/30/20 Yes Florian Reyes MD   clopidogrel (PLAVIX) 75 mg tablet Take 1 tablet (75 mg total) by mouth once daily. 5/2/19 5/1/20 Yes Florian Reyes MD   diphenhydrAMINE (BENADRYL) 25 mg capsule Take 25 mg by mouth every 6 (six) hours as needed for Itching.   Yes Historical Provider, MD   fluconazole (DIFLUCAN) 200 MG Tab Take 100 mg by mouth once daily.   Yes Historical Provider, MD   furosemide (LASIX) 40 MG tablet Take 40 mg by mouth 2 (two) times daily.   Yes Historical Provider, MD   loratadine (CLARITIN REDITABS) 10 mg dissolvable tablet Take 10 mg by mouth once daily.   Yes Historical Provider, MD   sodium chloride 1 gram tablet Take 1 g by mouth 3 (three) times daily.   Yes Historical Provider, MD   traZODone (DESYREL) 50 MG tablet Take 50 mg by mouth every evening.   Yes Historical Provider, MD   doxycycline (VIBRAMYCIN) 100 MG Cap Take 100 mg by mouth every 12 (twelve) hours.    Historical Provider, MD   fluticasone propionate (FLONASE) 50 mcg/actuation nasal spray 1 spray by Each Nostril route once daily.    Historical Provider, MD   multivitamin capsule Take 1 capsule by mouth once daily.    Historical Provider, MD   nystatin, bulk, 100 million unit Powd by Misc.(Non-Drug; Combo Route) route.    Historical Provider, MD   potassium chloride (MICRO-K) 10 MEQ CpSR Take 10 mEq by mouth once.     "Historical Provider, MD       Family History:  History reviewed. No pertinent family history.    Social History:  Social History     Tobacco Use    Smoking status: Former Smoker; 38 years ago 2 packs per day    Smokeless tobacco: Never Used   Substance Use Topics    Alcohol use: No    Drug use: No       Review of Systems:  General: Denies fever, chills and night sweats.  HEENT: Denies runny nose, congestion, sore throat and difficulty swallowing.  Pulmonary: positive for dry cough, shortness of breath, denies hemoptysis.   Cardiac: Denies chest pain, palpitations and edema.   Gastrointestinal: Denies abdominal pain, nausea, vomiting, constipation and diarrhea.   Urinary: Denies dysuria, hematuria and discharge.   Musculoskeletal: Denies muscle pain, joint pain and weakness.   Neurologic: Denies numbness, tingling and syncope.  Skin: Denies itching and rashes     Health Maintaince :   Primary Care Physician: Delon   Immunizations:   Currently on File within the [facility]:   Most Recent Immunizations   Administered Date(s) Administered    PPD Test 2019     TDap is not up to date  Influenza is not up to date  Pneumovaxis not up to date  Cancer Screening:  Colonoscopy: is not up to date     Objective:   Last 24 Hour Vital Signs:  BP  Min: 95/62  Max: 175/75  Temp  Av.2 °F (36.8 °C)  Min: 97.7 °F (36.5 °C)  Max: 98.7 °F (37.1 °C)  Pulse  Av.6  Min: 47  Max: 54  Resp  Av  Min: 12  Max: 19  SpO2  Av.2 %  Min: 98 %  Max: 100 %  Height  Av' 8" (172.7 cm)  Min: 5' 8" (172.7 cm)  Max: 5' 8" (172.7 cm)  Weight  Av.8 kg (198 lb)  Min: 89.8 kg (198 lb)  Max: 89.8 kg (198 lb)  Body mass index is 30.11 kg/m².  No intake/output data recorded.    Physical Examination:  Triage: BP: (!) 175/75  Pulse: (!) 54  Temp: 98.7 °F (37.1 °C)  Resp: 12  Height: 5' 8" (172.7 cm)  Weight: 89.8 kg (198 lb)  BMI (Calculated): 30.2  SpO2: 98 %  Exam: BP 95/62 (BP Location: Left arm, Patient " "Position: Lying)   Pulse (!) 48   Temp 97.7 °F (36.5 °C) (Oral)   Resp 18   Ht 5' 8" (1.727 m)   Wt 89.8 kg (198 lb)   SpO2 98%   BMI 30.11 kg/m²     Gen: Alert to person, place and time. Well appearing, non toxic, NAD.   Head: Atraumatic, normocephalic  Neck: JVD present, neck supple, no LAD  Eyes: Pupils equal and reactive to light,extra-ocular eye movements intact,  Mouth: no erythema, exudates, or lesions present in oropharynx  CV: RRR, systolic murmurs present     Lungs: CTAB no crackles, wheezing, or rhonchi.  No fremitus noted.    Abd: Soft, non tender, non distended, bowel sounds present. No rebound tenderness or guarding present.    EXT: 2+ radial and dorsalis pedis pulses bilaterally.  Bilateral 3+ pitting edema from feet to thighs.  Bilateral redness in both legs at mid shins, non tender, not warm.    Neuro: CN II: pupils equal, reactive to light. CN III, IV, VI: extra-ocular muscles intact. No focal neuro deficits noted.  UE/LE muscles move against resistance with no problem. Sensation intact in feet bilaterally.    Skin: Warm and dry.  No jaundice noted.          Laboratory:  Most Recent Data:  CBC:   Lab Results   Component Value Date    WBC 5.70 07/26/2019    HGB 10.2 (L) 07/26/2019     07/26/2019    MCV 87 07/26/2019    RDW 18.1 (H) 07/26/2019     BMP:   Lab Results   Component Value Date     07/26/2019    K 4.3 07/26/2019     07/26/2019    CO2 21 (L) 07/26/2019     07/26/2019    BUN 17 07/26/2019    CREATININE 0.8 07/26/2019    CALCIUM 8.4 (L) 07/26/2019    MG 1.8 04/30/2019    PHOS 3.7 04/30/2019     LFTs:   Lab Results   Component Value Date    PROT 6.3 07/26/2019    ALBUMIN 3.3 (L) 07/26/2019    AST 29 07/26/2019    ALKPHOS 104 07/26/2019    ALT 21 07/26/2019     Coags:   Lab Results   Component Value Date    INR 1.1 07/26/2019     FLP:   Lab Results   Component Value Date    CHOL 197 04/29/2019    HDL 47 04/29/2019    LDLCALC 136.0 04/29/2019    TRIG 70 " 04/29/2019    CHOLHDL 23.9 04/29/2019     DM:   Lab Results   Component Value Date    HGBA1C 5.5 04/30/2019    LDLCALC 136.0 04/29/2019    CREATININE 0.8 07/26/2019     Thyroid:   Lab Results   Component Value Date    TSH 1.016 04/29/2019     Anemia:   Lab Results   Component Value Date    PAPOTCUY99 337 04/29/2019    FOLATE 14.5 04/29/2019     Cardiac:   Lab Results   Component Value Date    TROPONINI 0.029 (H) 07/26/2019    BNP 3,183 (H) 07/26/2019     Urinalysis:   Lab Results   Component Value Date    COLORU CAMRON 04/04/2010    SPECGRAV 1.020 04/04/2010    NITRITE NEG 04/04/2010    KETONESU text 04/04/2010    UROBILINOGEN NEG 04/04/2010    WBCUA 2-5 04/04/2010     Other Results:  Radiology:  X-ray Chest Ap Portable    Result Date: 7/26/2019  EXAMINATION: XR CHEST AP PORTABLE CLINICAL HISTORY: CHF; TECHNIQUE: Single frontal view of the chest was performed. COMPARISON: 04/29/2019. FINDINGS: Monitoring EKG leads are present.  There are postoperative changes of prior median sternotomy.  The trachea is unremarkable.  There is stable enlargement of the cardiomediastinal silhouette.  There is no evidence of free air beneath the hemidiaphragms.  There are new moderate bilateral pleural effusions.  There is no evidence of a pneumothorax.  There is no evidence of pneumomediastinum.  There is pulmonary vascular congestion.  There are bibasilar airspace opacities.  There are extensive degenerative changes in the osseous structures.     Cardiomegaly with bilateral pleural effusions, pulmonary vascular congestion, and bibasilar airspace opacities, most consistent with decompensated CHF. Electronically signed by: Rasta Lagos MD Date:    07/26/2019 Time:    20:19      I have reviewed the above reports as well as previous records.     Assessment:     Nicolas Wolff is a 88 y.o. male with:  Present on Admission:   Acute on chronic combined systolic and diastolic congestive heart failure   Coronary artery disease involving  native coronary artery of native heart without angina pectoris   Normocytic anemia       Plan:     1. Acute on chronic combined systolic and diastolic CHF  -pt reporting about 1 month hx of bilateral leg swelling, elevated JVD, 2-3 day history of SOB  -CXR (7/26/19) consistent with decompensated HF, BNP 3183, 3+ pitting edema from feet to thighs bilaterally clinically consistent with CHF exasperation   -Got 80mg Lasix IV in ED, continuing lasix IV 80 mg BID during admission   -Cr stable at this time, will get daily BMP     2. Normocytic Anemia   -on admit H/H 10.2/31.2 MCV: 87  -Ordering Iron, ferritin, TIBC, folate, and b12 for further evaluation   -daily CBC     3.  CAD s/p CABG  -restarting home medications: ASA 81 PO daily, clopidogrel 75 mg PO daily,     4. HLD  -starting home Lipitor 40 daily     5. HTN  -pt denies hx of HTN and not on any antihypertensive meds  -BP has been elevated around 172/74 while he has been here  -will continue to monitor and add antihypertensive meds as needed     6. Aortic Stenosis   -TTE 4/30/2019 significant for severe aortic stenosis  -holosystolic murmur heard on exam       PPx: enoxaparin 40 mg     Rosendo Mac MD  [facility] HO-I

## 2019-07-27 NOTE — PROGRESS NOTES
"[facility] Resident HOAyalaI Progress Note  Pt Name: Nicolas Wolff   Room: K419/K419 A  Admitted On: 2019  Today: 2019    Subjective:       Reports sleeping well overnight. Denies chest pain, SOB, nausea, vomiting, abdominal pain, fever, chills, and dysuria  Reports that he thinks his leg swelling has gone down.  Otherwise no complaints or concerns at this time.       Objective:   Last 24 Hour Vital Signs:  BP  Min: 95/62  Max: 220/87  Temp  Av.3 °F (36.3 °C)  Min: 96.2 °F (35.7 °C)  Max: 98.7 °F (37.1 °C)  Pulse  Av.4  Min: 43  Max: 54  Resp  Av.1  Min: 12  Max: 20  SpO2  Av %  Min: 98 %  Max: 100 %  Height  Av' 8" (172.7 cm)  Min: 5' 8" (172.7 cm)  Max: 5' 8" (172.7 cm)  Weight  Av.5 kg (199 lb 8.5 oz)  Min: 89.8 kg (198 lb)  Max: 91.2 kg (201 lb 1 oz)  No intake/output data recorded.    Physical Examination:     Gen: Alert to person, place and time. Well appearing, non toxic, NAD.   Head: Atraumatic, normocephalic  Neck: elevated JVD present, neck supple, no LAD  Eyes: Pupils equal and reactive to light,extra-ocular eye movements intact,  Mouth: no erythema, exudates, or lesions present in oropharynx  CV: RRR, holosystolic murmur present     Lungs: CTAB no crackles, wheezing, or rhonchi.  No fremitus noted.    Abd: Soft, non tender, non distended, bowel sounds present. No rebound tenderness or guarding present.    EXT: 2+ radial and dorsalis pedis pulses bilaterally.  Bilateral 3+ pitting edema from feet to thighs.  Bilateral redness in both legs at mid shins, non tender, not warm.    Neuro: CN II: pupils equal, reactive to light. CN III, IV, VI: extra-ocular muscles intact. No focal neuro deficits noted.  UE/LE muscles move against resistance with no problem. Sensation intact in feet bilaterally.    Skin: Warm and dry.  No jaundice noted.              Laboratory:    Recent Labs   Lab 19   WBC 5.70      MCV 87   RDW 18.1*      K 4.3    "   CO2 21*   BUN 17   CREATININE 0.8      CALCIUM 8.4*   PROT 6.3   ALBUMIN 3.3*   AST 29   ALKPHOS 104   ALT 21       Trended Cardiac Data:  Recent Labs   Lab 07/26/19 1945   TROPONINI 0.029*   BNP 3,183*     Other Results:  Radiology:  X-ray Chest Ap Portable    Result Date: 7/26/2019  EXAMINATION: XR CHEST AP PORTABLE CLINICAL HISTORY: CHF; TECHNIQUE: Single frontal view of the chest was performed. COMPARISON: 04/29/2019. FINDINGS: Monitoring EKG leads are present.  There are postoperative changes of prior median sternotomy.  The trachea is unremarkable.  There is stable enlargement of the cardiomediastinal silhouette.  There is no evidence of free air beneath the hemidiaphragms.  There are new moderate bilateral pleural effusions.  There is no evidence of a pneumothorax.  There is no evidence of pneumomediastinum.  There is pulmonary vascular congestion.  There are bibasilar airspace opacities.  There are extensive degenerative changes in the osseous structures.     Cardiomegaly with bilateral pleural effusions, pulmonary vascular congestion, and bibasilar airspace opacities, most consistent with decompensated CHF. Electronically signed by: Rasta Lagos MD Date:    07/26/2019 Time:    20:19      I have reviewed the above reports as well as previous records.    Current Medications:     Infusions:       Scheduled:   aspirin  81 mg Oral Daily    atorvastatin  40 mg Oral Daily    cetirizine  10 mg Oral Daily    clopidogrel  75 mg Oral Daily    enoxaparin  40 mg Subcutaneous Daily    fluticasone propionate  1 spray Each Nostril Daily    furosemide  80 mg Intravenous BID    traZODone  25 mg Oral QHS        PRN:  acetaminophen, ondansetron, pneumoc 13-edwni conj-dip cr(PF), sodium chloride 0.9%, sodium chloride 0.9%      Assessment:     Nicolas Wolff is a 88 y.o.male with  Patient Active Problem List    Diagnosis Date Noted    Hypertension 07/27/2019    Acute on chronic combined systolic and diastolic  congestive heart failure 07/26/2019    Normocytic anemia 07/26/2019    Coronary artery disease involving native coronary artery of native heart without angina pectoris 05/01/2019    LV dysfunction 05/01/2019    Ischemic cardiomyopathy 05/01/2019    Nonrheumatic aortic valve stenosis 05/01/2019    Vertebral artery occlusion, left     Vertigo     TIA (transient ischemic attack) 04/29/2019    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 Acute on chronic combined systolic and diastolic CHF  -pt reporting about 1 month hx of bilateral leg swelling, elevated JVD, 2-3 day history of SOB  -CXR (7/26/19) consistent with decompensated HF, BNP 3183, 3+ pitting edema from feet to thighs bilaterally clinically consistent with CHF exasperation   -Got 80mg Lasix IV in ED, continuing lasix IV 80 mg BID during admission   -7/27/2019 put out 1,800 ml of urine overnight, net -1600  -Cr stable at this time, will get daily BMP      2. Normocytic Anemia   -on admit H/H 10.2/31.2 MCV: 87  -Ordering Iron, ferritin, TIBC, folate, and b12 for further evaluation   -daily CBC      3.  CAD s/p CABG  -restarting home medications: ASA 81 PO daily, clopidogrel 75 mg PO daily,      4. HLD  -starting home Lipitor 40 daily      5. HTN  -pt denies hx of HTN and not on any antihypertensive meds  -BP has been elevated around 172/74 while he has been here. Overnight BP got up to 220/87  -starting Lisinopril 20 mg PO daily     6. Aortic Stenosis   -TTE 4/30/2019 significant for severe aortic stenosis  -holosystolic murmur heard on exam        PPx: enoxaparin 40 mg     Rosendo Mac MD  [facility] Internal Medicine HO-I

## 2019-07-27 NOTE — PLAN OF CARE
VN rounds:  VN cued into pt's room with pt's permission.  Pt resting in bed in low position with call bell in lap and urinal within reach.  Fall risk protocol discussed with pt.  VN instructed to call for assistance.  Pt aware and agreeable.  Pt denies pain, anxiety or dyspnea. No acute distress noted.  Allowed time for questions.  Will continue to be available and intervene as needed.

## 2019-07-28 LAB
ANION GAP SERPL CALC-SCNC: 11 MMOL/L (ref 8–16)
BASOPHILS # BLD AUTO: 0.01 K/UL (ref 0–0.2)
BASOPHILS NFR BLD: 0.1 % (ref 0–1.9)
BUN SERPL-MCNC: 24 MG/DL (ref 8–23)
CALCIUM SERPL-MCNC: 8.4 MG/DL (ref 8.7–10.5)
CHLORIDE SERPL-SCNC: 100 MMOL/L (ref 95–110)
CO2 SERPL-SCNC: 24 MMOL/L (ref 23–29)
CREAT SERPL-MCNC: 1 MG/DL (ref 0.5–1.4)
DIFFERENTIAL METHOD: ABNORMAL
EOSINOPHIL # BLD AUTO: 0 K/UL (ref 0–0.5)
EOSINOPHIL NFR BLD: 0.1 % (ref 0–8)
ERYTHROCYTE [DISTWIDTH] IN BLOOD BY AUTOMATED COUNT: 18.4 % (ref 11.5–14.5)
EST. GFR  (AFRICAN AMERICAN): >60 ML/MIN/1.73 M^2
EST. GFR  (NON AFRICAN AMERICAN): >60 ML/MIN/1.73 M^2
GLUCOSE SERPL-MCNC: 102 MG/DL (ref 70–110)
HCT VFR BLD AUTO: 29.7 % (ref 40–54)
HGB BLD-MCNC: 9.6 G/DL (ref 14–18)
LYMPHOCYTES # BLD AUTO: 1 K/UL (ref 1–4.8)
LYMPHOCYTES NFR BLD: 11.8 % (ref 18–48)
MCH RBC QN AUTO: 28 PG (ref 27–31)
MCHC RBC AUTO-ENTMCNC: 32.3 G/DL (ref 32–36)
MCV RBC AUTO: 87 FL (ref 82–98)
MONOCYTES # BLD AUTO: 1 K/UL (ref 0.3–1)
MONOCYTES NFR BLD: 11.6 % (ref 4–15)
NEUTROPHILS # BLD AUTO: 6.2 K/UL (ref 1.8–7.7)
NEUTROPHILS NFR BLD: 76.4 % (ref 38–73)
PLATELET # BLD AUTO: 207 K/UL (ref 150–350)
PMV BLD AUTO: 10.7 FL (ref 9.2–12.9)
POTASSIUM SERPL-SCNC: 4 MMOL/L (ref 3.5–5.1)
RBC # BLD AUTO: 3.43 M/UL (ref 4.6–6.2)
SODIUM SERPL-SCNC: 135 MMOL/L (ref 136–145)
WBC # BLD AUTO: 8.17 K/UL (ref 3.9–12.7)

## 2019-07-28 PROCEDURE — 25000003 PHARM REV CODE 250: Performed by: STUDENT IN AN ORGANIZED HEALTH CARE EDUCATION/TRAINING PROGRAM

## 2019-07-28 PROCEDURE — 63600175 PHARM REV CODE 636 W HCPCS: Performed by: STUDENT IN AN ORGANIZED HEALTH CARE EDUCATION/TRAINING PROGRAM

## 2019-07-28 PROCEDURE — 80048 BASIC METABOLIC PNL TOTAL CA: CPT

## 2019-07-28 PROCEDURE — 94761 N-INVAS EAR/PLS OXIMETRY MLT: CPT

## 2019-07-28 PROCEDURE — 96376 TX/PRO/DX INJ SAME DRUG ADON: CPT

## 2019-07-28 PROCEDURE — 25000242 PHARM REV CODE 250 ALT 637 W/ HCPCS: Performed by: STUDENT IN AN ORGANIZED HEALTH CARE EDUCATION/TRAINING PROGRAM

## 2019-07-28 PROCEDURE — 94640 AIRWAY INHALATION TREATMENT: CPT

## 2019-07-28 PROCEDURE — 11000001 HC ACUTE MED/SURG PRIVATE ROOM

## 2019-07-28 PROCEDURE — 96372 THER/PROPH/DIAG INJ SC/IM: CPT

## 2019-07-28 PROCEDURE — 36415 COLL VENOUS BLD VENIPUNCTURE: CPT

## 2019-07-28 PROCEDURE — 85025 COMPLETE CBC W/AUTO DIFF WBC: CPT

## 2019-07-28 PROCEDURE — 27000221 HC OXYGEN, UP TO 24 HOURS

## 2019-07-28 RX ORDER — FUROSEMIDE 40 MG/1
40 TABLET ORAL DAILY
Status: DISCONTINUED | OUTPATIENT
Start: 2019-07-29 | End: 2019-07-29

## 2019-07-28 RX ORDER — FUROSEMIDE 10 MG/ML
80 INJECTION INTRAMUSCULAR; INTRAVENOUS 2 TIMES DAILY
Status: COMPLETED | OUTPATIENT
Start: 2019-07-28 | End: 2019-07-28

## 2019-07-28 RX ORDER — IPRATROPIUM BROMIDE AND ALBUTEROL SULFATE 2.5; .5 MG/3ML; MG/3ML
3 SOLUTION RESPIRATORY (INHALATION) ONCE
Status: COMPLETED | OUTPATIENT
Start: 2019-07-28 | End: 2019-07-28

## 2019-07-28 RX ORDER — GUAIFENESIN 100 MG/5ML
200 SOLUTION ORAL EVERY 4 HOURS PRN
Status: DISCONTINUED | OUTPATIENT
Start: 2019-07-28 | End: 2019-07-31 | Stop reason: HOSPADM

## 2019-07-28 RX ADMIN — CETIRIZINE HYDROCHLORIDE 10 MG: 10 TABLET, FILM COATED ORAL at 09:07

## 2019-07-28 RX ADMIN — ASPIRIN 81 MG: 81 TABLET, COATED ORAL at 09:07

## 2019-07-28 RX ADMIN — GUAIFENESIN 200 MG: 200 SOLUTION ORAL at 08:07

## 2019-07-28 RX ADMIN — LISINOPRIL 20 MG: 20 TABLET ORAL at 09:07

## 2019-07-28 RX ADMIN — ENOXAPARIN SODIUM 40 MG: 100 INJECTION SUBCUTANEOUS at 10:07

## 2019-07-28 RX ADMIN — CLOPIDOGREL BISULFATE 75 MG: 75 TABLET ORAL at 09:07

## 2019-07-28 RX ADMIN — TRAZODONE HYDROCHLORIDE 25 MG: 50 TABLET ORAL at 08:07

## 2019-07-28 RX ADMIN — FLUTICASONE PROPIONATE 50 MCG: 50 SPRAY, METERED NASAL at 09:07

## 2019-07-28 RX ADMIN — GUAIFENESIN 200 MG: 200 SOLUTION ORAL at 06:07

## 2019-07-28 RX ADMIN — FUROSEMIDE 80 MG: 10 INJECTION, SOLUTION INTRAVENOUS at 05:07

## 2019-07-28 RX ADMIN — FUROSEMIDE 80 MG: 10 INJECTION, SOLUTION INTRAMUSCULAR; INTRAVENOUS at 09:07

## 2019-07-28 RX ADMIN — ATORVASTATIN CALCIUM 40 MG: 40 TABLET, FILM COATED ORAL at 09:07

## 2019-07-28 RX ADMIN — IPRATROPIUM BROMIDE AND ALBUTEROL SULFATE 3 ML: .5; 3 SOLUTION RESPIRATORY (INHALATION) at 08:07

## 2019-07-28 NOTE — PLAN OF CARE
Problem: Adult Inpatient Plan of Care  Goal: Plan of Care Review  Outcome: Revised  Pt stable. NO distress noted. POC reviewed with pt. Pt verbalized understanding. Pt remains SR 40s-50s on the monitor. NO true red alarms noted. Pt denied pain. Urine output monitored. Pt on 2LNC sating %. Fall precautions maintained. Bed in lowest position, call light in reach and bed alarm on.

## 2019-07-28 NOTE — PROGRESS NOTES
[facility] Medicine Progress Note    Primary Team: [facility] Hospitalist Team B  Attending Physician: Momo Pichardo MD  Resident: Will Baumgartener,MD  Intern: Pipe Zarco MD    Subjective:   Pt resting in bed. Stated that he had felt some SOB earlier and had gotten a treatment this am. Currently, no SOB. Denies any currently problems including fevers, chills, chest pain, SOB, nausea, vomiting, or diarrhea.      Objective:     Last 24 Hour Vital Signs:  BP  Min: 127/58  Max: 163/67  Temp  Av.9 °F (36.1 °C)  Min: 96.1 °F (35.6 °C)  Max: 97.9 °F (36.6 °C)  Pulse  Av.2  Min: 41  Max: 98  Resp  Av.4  Min: 20  Max: 22  SpO2  Av.1 %  Min: 98 %  Max: 100 %  Weight  Av.7 kg (197 lb 12 oz)  Min: 89.7 kg (197 lb 12 oz)  Max: 89.7 kg (197 lb 12 oz)  I/O last 3 completed shifts:  In: 990 [P.O.:990]  Out: 3875 [Urine:3875]    Physical Examination:  Physical Exam   Constitutional: He is oriented to person, place, and time and well-developed, well-nourished, and in no distress. No distress.   HENT:   Head: Normocephalic and atraumatic.   Eyes: Conjunctivae and EOM are normal.   Neck: Normal range of motion. Neck supple. No tracheal deviation present. No thyromegaly present.   Cardiovascular: Normal rate, regular rhythm and intact distal pulses. Exam reveals no gallop and no friction rub.   Murmur heard.  Pulmonary/Chest: Effort normal and breath sounds normal. No respiratory distress. He has no wheezes. He has no rales.   Abdominal: Soft. Bowel sounds are normal. He exhibits no distension. There is no tenderness.   Musculoskeletal: Normal range of motion.   Neurological: He is alert and oriented to person, place, and time.   Skin: Skin is warm and dry. He is not diaphoretic.   Nursing note and vitals reviewed.      Laboratory:  Laboratory Data Reviewed: yes  Pertinent Findings:  RBC: 3.43  H/H: 9.6/29.7  Na:135  BUN: 24    Microbiology Data Reviewed: yes  Pertinent Findings:  Microbiology Results (last 7  days)     ** No results found for the last 168 hours. **          Other Results:  EKG (my interpretation):   Sinus bradycardia with 1st degree A-V block with Premature atrial complexes  Right bundle branch block  T wave abnormality, consider lateral ischemia  Abnormal ECG  When compared with ECG of 26-JUL-2019 19:18,  Left anterior fascicular block is no longer Present  Criteria for Septal infarct are no longer Present  T wave inversion more evident in Lateral leads  Radiology Data Reviewed: yes  Pertinent Findings:  Imaging Results          X-Ray Chest AP Portable (Final result)  Result time 07/26/19 20:19:00    Final result by Rasta Lagos MD (07/26/19 20:19:00)                 Impression:      Cardiomegaly with bilateral pleural effusions, pulmonary vascular congestion, and bibasilar airspace opacities, most consistent with decompensated CHF.      Electronically signed by: Rasta Lagos MD  Date:    07/26/2019  Time:    20:19             Narrative:    EXAMINATION:  XR CHEST AP PORTABLE    CLINICAL HISTORY:  CHF;    TECHNIQUE:  Single frontal view of the chest was performed.    COMPARISON:  04/29/2019.    FINDINGS:  Monitoring EKG leads are present.  There are postoperative changes of prior median sternotomy.  The trachea is unremarkable.  There is stable enlargement of the cardiomediastinal silhouette.  There is no evidence of free air beneath the hemidiaphragms.  There are new moderate bilateral pleural effusions.  There is no evidence of a pneumothorax.  There is no evidence of pneumomediastinum.  There is pulmonary vascular congestion.  There are bibasilar airspace opacities.  There are extensive degenerative changes in the osseous structures.                                Current Medications:     Infusions:       Scheduled:   aspirin  81 mg Oral Daily    atorvastatin  40 mg Oral Daily    cetirizine  10 mg Oral Daily    clopidogrel  75 mg Oral Daily    enoxaparin  40 mg Subcutaneous Daily    fluticasone  propionate  1 spray Each Nostril Daily    furosemide  80 mg Intravenous BID    lisinopril  20 mg Oral Daily    traZODone  25 mg Oral QHS        PRN:  acetaminophen, guaifenesin 100 mg/5 ml, ondansetron, pneumoc 13-edwin conj-dip cr(PF), sodium chloride 0.9%, sodium chloride 0.9%    Antibiotics and Day Number of Therapy  None  Lines and Day Number of Therapy:  PIV x 1 day    Assessment:     Nicolas Wolff is a 88 y.o.male with  Patient Active Problem List    Diagnosis Date Noted    Hypertension 07/27/2019    Shortness of breath 07/27/2019    Acute on chronic congestive heart failure     Acute on chronic combined systolic and diastolic congestive heart failure 07/26/2019    Normocytic anemia 07/26/2019    Coronary artery disease involving native coronary artery of native heart without angina pectoris 05/01/2019    LV dysfunction 05/01/2019    Ischemic cardiomyopathy 05/01/2019    Nonrheumatic aortic valve stenosis 05/01/2019    Vertebral artery occlusion, left     Vertigo     TIA (transient ischemic attack) 04/29/2019    S/P CABG x 5 10/29/2007    Stenosis of left subclavian artery 10/25/2007        Plan:   1. Acute on chronic combined systolic and diastolic CHF  -pt reporting about 1 month hx of bilateral leg swelling, elevated JVD, 2-3 day history of SOB  -CXR (7/26/19) consistent with decompensated HF, BNP 3183, 3+ pitting edema from feet to thighs bilaterally clinically consistent with CHF exasperation   -Got 80mg Lasix IV in ED, continuing lasix IV 80 mg BID during admission   -7/27/2019 put out 1,800 ml of urine overnight, net -1600  7/28 - 7/29- net negative negative 95.   -Cr stable at this time, will get daily BMP      2. Normocytic Anemia   -on admit H/H 10.2/31.2 MCV: 87  -7/28: 9.6/29.7 MCV 87  -Ordered Iron, ferritin, TIBC, folate, and b12 for further evaluation.  -Iron was 35 , transferrin was 197, and TIBC was 12 on 7/26.    -Ferritin, folate, and B12 were WNL.  -daily CBC      3.   CAD s/p CABG  -restarting home medications: ASA 81 PO daily, clopidogrel 75 mg PO daily,      4. HLD  -Continue home Lipitor 40 daily      5. HTN  -pt denies hx of HTN and not on any antihypertensive meds  -BP has been elevated around 172/74 while he has been here. Overnight BP got up to 220/87  -Continue Lisinopril 20 mg PO daily.  -BP currently at 127/58.      6. Aortic Stenosis   -TTE 4/30/2019 significant for severe aortic stenosis  -holosystolic murmur heard on exam       Pipe Zarco MD  \[facility]\"" Internal Medicine HO-1    \[facility]\"" Medicine Hospitalist Pager numbers:   \[facility]\"" Hospitalist Medicine Team A (Bjorn/Tamar): [phone]  \[facility]\"" Hospitalist Medicine Team B (Aaron/Marino):  [phone]

## 2019-07-28 NOTE — PLAN OF CARE
[facility] Plan of Care    Notified by nursing that patient had some wheezing and requested breathing treatment. One time duoneb ordered, will check out to primary team to address further need.     Patient also requesting robitussin for cough. Ordered.     Caren Boyle, DO  [facility]  07/28/2019  5:13 AM

## 2019-07-29 LAB
ANION GAP SERPL CALC-SCNC: 10 MMOL/L (ref 8–16)
BASOPHILS # BLD AUTO: 0.03 K/UL (ref 0–0.2)
BASOPHILS NFR BLD: 0.4 % (ref 0–1.9)
BUN SERPL-MCNC: 24 MG/DL (ref 8–23)
CALCIUM SERPL-MCNC: 8.4 MG/DL (ref 8.7–10.5)
CHLORIDE SERPL-SCNC: 99 MMOL/L (ref 95–110)
CO2 SERPL-SCNC: 28 MMOL/L (ref 23–29)
CREAT SERPL-MCNC: 1 MG/DL (ref 0.5–1.4)
DIFFERENTIAL METHOD: ABNORMAL
EOSINOPHIL # BLD AUTO: 0.3 K/UL (ref 0–0.5)
EOSINOPHIL NFR BLD: 3.5 % (ref 0–8)
ERYTHROCYTE [DISTWIDTH] IN BLOOD BY AUTOMATED COUNT: 18.4 % (ref 11.5–14.5)
EST. GFR  (AFRICAN AMERICAN): >60 ML/MIN/1.73 M^2
EST. GFR  (NON AFRICAN AMERICAN): >60 ML/MIN/1.73 M^2
GLUCOSE SERPL-MCNC: 83 MG/DL (ref 70–110)
HCT VFR BLD AUTO: 30.6 % (ref 40–54)
HGB BLD-MCNC: 10 G/DL (ref 14–18)
LYMPHOCYTES # BLD AUTO: 1.8 K/UL (ref 1–4.8)
LYMPHOCYTES NFR BLD: 24.7 % (ref 18–48)
MCH RBC QN AUTO: 28 PG (ref 27–31)
MCHC RBC AUTO-ENTMCNC: 32.7 G/DL (ref 32–36)
MCV RBC AUTO: 86 FL (ref 82–98)
MONOCYTES # BLD AUTO: 0.7 K/UL (ref 0.3–1)
MONOCYTES NFR BLD: 9.2 % (ref 4–15)
NEUTROPHILS # BLD AUTO: 4.5 K/UL (ref 1.8–7.7)
NEUTROPHILS NFR BLD: 62.2 % (ref 38–73)
PLATELET # BLD AUTO: 196 K/UL (ref 150–350)
PMV BLD AUTO: 10.8 FL (ref 9.2–12.9)
POTASSIUM SERPL-SCNC: 3.4 MMOL/L (ref 3.5–5.1)
RBC # BLD AUTO: 3.57 M/UL (ref 4.6–6.2)
SODIUM SERPL-SCNC: 137 MMOL/L (ref 136–145)
WBC # BLD AUTO: 7.17 K/UL (ref 3.9–12.7)

## 2019-07-29 PROCEDURE — 11000001 HC ACUTE MED/SURG PRIVATE ROOM

## 2019-07-29 PROCEDURE — 25000003 PHARM REV CODE 250: Performed by: STUDENT IN AN ORGANIZED HEALTH CARE EDUCATION/TRAINING PROGRAM

## 2019-07-29 PROCEDURE — 94640 AIRWAY INHALATION TREATMENT: CPT

## 2019-07-29 PROCEDURE — 36415 COLL VENOUS BLD VENIPUNCTURE: CPT

## 2019-07-29 PROCEDURE — 96372 THER/PROPH/DIAG INJ SC/IM: CPT

## 2019-07-29 PROCEDURE — 63600175 PHARM REV CODE 636 W HCPCS: Performed by: STUDENT IN AN ORGANIZED HEALTH CARE EDUCATION/TRAINING PROGRAM

## 2019-07-29 PROCEDURE — 85025 COMPLETE CBC W/AUTO DIFF WBC: CPT

## 2019-07-29 PROCEDURE — 97116 GAIT TRAINING THERAPY: CPT

## 2019-07-29 PROCEDURE — 27000221 HC OXYGEN, UP TO 24 HOURS

## 2019-07-29 PROCEDURE — 97165 OT EVAL LOW COMPLEX 30 MIN: CPT

## 2019-07-29 PROCEDURE — 25000242 PHARM REV CODE 250 ALT 637 W/ HCPCS: Performed by: STUDENT IN AN ORGANIZED HEALTH CARE EDUCATION/TRAINING PROGRAM

## 2019-07-29 PROCEDURE — 94761 N-INVAS EAR/PLS OXIMETRY MLT: CPT

## 2019-07-29 PROCEDURE — 97161 PT EVAL LOW COMPLEX 20 MIN: CPT

## 2019-07-29 PROCEDURE — 80048 BASIC METABOLIC PNL TOTAL CA: CPT

## 2019-07-29 RX ORDER — FUROSEMIDE 40 MG/1
80 TABLET ORAL 2 TIMES DAILY
Start: 2019-07-29

## 2019-07-29 RX ORDER — RAMELTEON 8 MG/1
8 TABLET ORAL ONCE
Status: COMPLETED | OUTPATIENT
Start: 2019-07-29 | End: 2019-07-29

## 2019-07-29 RX ORDER — LISINOPRIL 20 MG/1
20 TABLET ORAL DAILY
Qty: 90 TABLET | Refills: 3
Start: 2019-07-30 | End: 2020-01-01

## 2019-07-29 RX ORDER — POTASSIUM CHLORIDE 20 MEQ/1
20 TABLET, EXTENDED RELEASE ORAL
Status: COMPLETED | OUTPATIENT
Start: 2019-07-29 | End: 2019-07-29

## 2019-07-29 RX ORDER — FUROSEMIDE 40 MG/1
80 TABLET ORAL 2 TIMES DAILY
Status: DISCONTINUED | OUTPATIENT
Start: 2019-07-29 | End: 2019-07-31 | Stop reason: HOSPADM

## 2019-07-29 RX ORDER — FUROSEMIDE 40 MG/1
40 TABLET ORAL 2 TIMES DAILY
Status: DISCONTINUED | OUTPATIENT
Start: 2019-07-29 | End: 2019-07-29

## 2019-07-29 RX ORDER — IPRATROPIUM BROMIDE AND ALBUTEROL SULFATE 2.5; .5 MG/3ML; MG/3ML
3 SOLUTION RESPIRATORY (INHALATION) ONCE
Status: COMPLETED | OUTPATIENT
Start: 2019-07-29 | End: 2019-07-29

## 2019-07-29 RX ORDER — RAMELTEON 8 MG/1
8 TABLET ORAL NIGHTLY PRN
Status: DISCONTINUED | OUTPATIENT
Start: 2019-07-29 | End: 2019-07-31 | Stop reason: HOSPADM

## 2019-07-29 RX ADMIN — FLUTICASONE PROPIONATE 50 MCG: 50 SPRAY, METERED NASAL at 09:07

## 2019-07-29 RX ADMIN — ATORVASTATIN CALCIUM 40 MG: 40 TABLET, FILM COATED ORAL at 09:07

## 2019-07-29 RX ADMIN — RAMELTEON 8 MG: 8 TABLET, FILM COATED ORAL at 12:07

## 2019-07-29 RX ADMIN — ASPIRIN 81 MG: 81 TABLET, COATED ORAL at 09:07

## 2019-07-29 RX ADMIN — RAMELTEON 8 MG: 8 TABLET, FILM COATED ORAL at 03:07

## 2019-07-29 RX ADMIN — POTASSIUM CHLORIDE 20 MEQ: 20 TABLET, EXTENDED RELEASE ORAL at 01:07

## 2019-07-29 RX ADMIN — CETIRIZINE HYDROCHLORIDE 10 MG: 10 TABLET, FILM COATED ORAL at 09:07

## 2019-07-29 RX ADMIN — ENOXAPARIN SODIUM 40 MG: 100 INJECTION SUBCUTANEOUS at 10:07

## 2019-07-29 RX ADMIN — FUROSEMIDE 40 MG: 40 TABLET ORAL at 09:07

## 2019-07-29 RX ADMIN — POTASSIUM CHLORIDE 20 MEQ: 20 TABLET, EXTENDED RELEASE ORAL at 11:07

## 2019-07-29 RX ADMIN — IPRATROPIUM BROMIDE AND ALBUTEROL SULFATE 3 ML: .5; 3 SOLUTION RESPIRATORY (INHALATION) at 09:07

## 2019-07-29 RX ADMIN — CLOPIDOGREL BISULFATE 75 MG: 75 TABLET ORAL at 09:07

## 2019-07-29 RX ADMIN — LISINOPRIL 20 MG: 20 TABLET ORAL at 09:07

## 2019-07-29 RX ADMIN — FUROSEMIDE 80 MG: 40 TABLET ORAL at 05:07

## 2019-07-29 RX ADMIN — POTASSIUM CHLORIDE 20 MEQ: 20 TABLET, EXTENDED RELEASE ORAL at 09:07

## 2019-07-29 RX ADMIN — TRAZODONE HYDROCHLORIDE 25 MG: 50 TABLET ORAL at 08:07

## 2019-07-29 NOTE — DISCHARGE INSTRUCTIONS
Attachments:  Heart Failure, Discharge Instructions for (English)      Lisinopril tablets (English)      Furosemide tablets (English)

## 2019-07-29 NOTE — PLAN OF CARE
[facility]    FACILITY TRANSFER ORDERS      Patient Name: Nicolas Wolff  YOB: 1931    PCP: Ankur Morgan MD   PCP Address: [address] [place] [address]  PCP Phone Number: [phone]  PCP Fax: [phone]    Encounter Date: 07/29/2019    Admit to: [facility]    Vital Signs:  Routine    Diagnoses:   Active Hospital Problems    Diagnosis  POA    *Acute on chronic combined systolic and diastolic congestive heart failure [I50.43]  Yes    Hypertension [I10]  Yes    Shortness of breath [R06.02]  Yes    Acute on chronic congestive heart failure [I50.9]  Yes    Normocytic anemia [D64.9]  Yes    Coronary artery disease involving native coronary artery of native heart without angina pectoris [I25.10]  Yes      Resolved Hospital Problems   No resolved problems to display.       Allergies:Review of patient's allergies indicates:  No Known Allergies    Diet: cardiac diet    Activities: Activity as tolerated    Nursing: routine     Labs: none    CONSULTS:    Physical Therapy to evaluate and treat.  and Occupational Therapy to evaluate and treat.   PT/OT 3 days per week under Part B.    MISCELLANEOUS CARE:  n/a    WOUND CARE ORDERS  None    Medications: Review discharge medications with patient and family and provide education.      Current Discharge Medication List      START taking these medications    Details   lisinopril (PRINIVIL,ZESTRIL) 20 MG tablet Take 1 tablet (20 mg total) by mouth once daily.  Qty: 90 tablet, Refills: 3         CONTINUE these medications which have CHANGED    Details   furosemide (LASIX) 40 MG tablet Take 2 tablets (80 mg total) by mouth 2 (two) times daily.         CONTINUE these medications which have NOT CHANGED    Details   aspirin (ECOTRIN) 81 MG EC tablet Take 1 tablet (81 mg total) by mouth once daily.  Qty: 60 tablet, Refills: 5      atorvastatin (LIPITOR) 40 MG tablet Take 1 tablet (40 mg total) by mouth once daily.  Qty: 90  tablet, Refills: 3      carvedilol (COREG) 3.125 MG tablet Take 1 tablet (3.125 mg total) by mouth 2 (two) times daily with meals.  Qty: 60 tablet, Refills: 11      clopidogrel (PLAVIX) 75 mg tablet Take 1 tablet (75 mg total) by mouth once daily.  Qty: 30 tablet, Refills: 11      diphenhydrAMINE (BENADRYL) 25 mg capsule Take 25 mg by mouth every 6 (six) hours as needed for Itching.      loratadine (CLARITIN REDITABS) 10 mg dissolvable tablet Take 10 mg by mouth once daily.      sodium chloride 1 gram tablet Take 1 g by mouth 3 (three) times daily.      traZODone (DESYREL) 50 MG tablet Take 50 mg by mouth every evening.      fluticasone propionate (FLONASE) 50 mcg/actuation nasal spray 1 spray by Each Nostril route once daily.      multivitamin capsule Take 1 capsule by mouth once daily.      nystatin, bulk, 100 million unit Powd by Misc.(Non-Drug; Combo Route) route.      potassium chloride (MICRO-K) 10 MEQ CpSR Take 10 mEq by mouth once.         STOP taking these medications       fluconazole (DIFLUCAN) 200 MG Tab Comments:   Reason for Stopping:         doxycycline (VIBRAMYCIN) 100 MG Cap Comments:   Reason for Stopping:                    _________________________________  Parminder Williamson MD  07/29/2019

## 2019-07-29 NOTE — NURSING
Notified by tele monitor tech that patient is sustaining a HR in the 30s with lowest being 35 bpm. /64 and patient asymptomatic. Dr. Williamson notified and at bedside to assess patient.

## 2019-07-29 NOTE — PT/OT/SLP EVAL
Occupational Therapy   Evaluation    Name: Nicolas Wolff  MRN: [id number]  Admitting Diagnosis:  Acute on chronic combined systolic and diastolic congestive heart failure      Recommendations:     Discharge Recommendations: nursing facility, basic(with part B therapies)  Discharge Equipment Recommendations:  none  Barriers to discharge:  None    Assessment:     Nicolas Wolff is a 88 y.o. male with a medical diagnosis of Acute on chronic combined systolic and diastolic congestive heart failure.  He presents with deconditioning. Performance deficits affecting function: weakness, impaired endurance, impaired self care skills, impaired functional mobilty, gait instability, impaired balance, decreased upper extremity function, decreased lower extremity function, decreased ROM.      Rehab Prognosis: Good; patient would benefit from acute skilled OT services to address these deficits and reach maximum level of function.       Plan:     Patient to be seen 5 x/week to address the above listed problems via self-care/home management, therapeutic activities, therapeutic exercises  · Plan of Care Expires: 08/29/19  · Plan of Care Reviewed with: patient    Subjective     Chief Complaint: Pt c/o being tired after working with PT.  Patient/Family Comments/goals: To return to the NH and walk more    Occupational Profile:  Living Environment: Pt is group home resident at [facility]  Previous level of function: Per NH staff pt arrived ambulating short distances with RW (~3 months ago) but most often pushes w/c through NH, required assist with dressing and bathing per NH staff but pt states that he dresses himself (but may be poor historian)  Roles and Routines: Caretaker to self  Equipment Used at Home:  wheelchair, walker, rolling, hospital bed  Assistance upon Discharge: Staff    Pain/Comfort:  · Pain Rating 1: 0/10  · Location 2: perirectal    Patients cultural, spiritual, Hindu conflicts given the current situation:       Objective:     Communicated with: nsg prior to session.  Patient found up in chair with telemetry, peripheral IV(chair alarm) upon OT entry to room.    General Precautions: Standard, fall   Orthopedic Precautions:N/A   Braces: N/A     Occupational Performance:    Functional Mobility/Transfers:  · Patient completed Sit <> Stand Transfer with minimum assistance  with  rolling walker   · Patient completed Toilet Transfer Step Transfer technique with contact guard assistance with  rolling walker  Functional Mobility: Pt with fair dynamic seated and standing balance.  ·      Activities of Daily Living:  · Grooming: set up A to brush dentures seated in chair  · Lower Body Dressing: maximal assistance to don/doff B socks, pt able to doff R sock but desats to low-mid 80's upon forward flexion of trunk  · Toileting: contact guard assistance for clothing management and hygiene    Cognitive/Visual Perceptual:  Cognitive/Psychosocial Skills:     -       Oriented to: Person, Place, Time and Situation   -       Follows Commands/attention:Follows multistep  commands  -       Communication: clear/fluent  -       Memory: No Deficits noted  -       Safety awareness/insight to disability: intact   -       Mood/Affect/Coping skills/emotional control: Appropriate to situation     Physical Exam:  Postural examination/scapula alignment:    -       No postural abnormalities identified  Skin integrity: Visible skin intact  Sensation:    -       Intact  Motor Planning:    -       WFL  Dominant hand:    -       R handed  Upper Extremity Range of Motion: BUE shoulder flexion limited ~0-90*, otherwise WFL     Upper Extremity Strength:  RUE grossly 3to 3+/5; LUE 3+ to 4-/5   Strength:  B hands WFL  Fine Motor Coordination:    -       Intact  Gross motor coordination:   WFL      AMPAC 6 Click ADL:  AMPAC Total Score: 17    Treatment & Education:  Pt educated on role of OT and POC.   Pt performing skills as listed above.   Pt educated on  use of pursed lip breathing technique but demonstrated some difficulty with carryover of learning. Pt O2 sats monitored throughout session as RN reports pt with noted low O2 in PT session. Pt with low O2 sats in LB dressing but O2 sats 92% and above when ambulating with RW chair<>sink with fluctuating and slightly down trending O2 sats but declined fEducation:  urther ambulation 2/2 fatigue.     Patient left up in chair with all lines intact, call button in reach, chair alarm on and nsg notified    GOALS:   Multidisciplinary Problems     Occupational Therapy Goals        Problem: Occupational Therapy Goal    Goal Priority Disciplines Outcome Interventions   Occupational Therapy Goal     OT, PT/OT Ongoing (interventions implemented as appropriate)    Description:  Goals to be met by: 08/29/2019      Patient will increase functional independence with ADLs by performing:    UE Dressing with Stand-by Assistance.  LE Dressing with Minimal Assistance.  Grooming while standing with Supervision and Stand-by Assistance.  Toileting from toilet with Supervision and Stand-by Assistance for hygiene and clothing management.   Step transfer with Supervision.  Toilet transfer to toilet with Supervision.  Increased functional strength to WF for self care.  Upper extremity exercise program x8 reps per handout, with supervision.                      History:     Past Medical History:   Diagnosis Date    CHF (congestive heart failure)     Coronary artery disease     Coronary artery disease involving native coronary artery of native heart with angina pectoris 5/1/2019    Hypertension     Ischemic cardiomyopathy 5/1/2019    LVEF of 25% on 4/29/19.   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  No LIMA to LAD graft due to 80% left subclavian artery stenosis.    Stenosis of left subclavian artery  10/25/2007       Past Surgical History:   Procedure Laterality Date    BACK SURGERY      CORONARY ARTERY BYPASS GRAFT         Time Tracking:     OT Date of Treatment: 07/29/19  OT Start Time: 1100  OT Stop Time: 1115  OT Total Time (min): 15 min    Billable Minutes:Evaluation 15    Nanette Simmons OT  7/29/2019

## 2019-07-29 NOTE — PT/OT/SLP EVAL
Physical Therapy Evaluation    Patient Name:  Nicolas Wolff   MRN:  [id number]    Recommendations:     Discharge Recommendations:  nursing facility, basic, other (see comments)(With Part B for therapy sessions)   Discharge Equipment Recommendations: none   Barriers to discharge: None    Assessment:     Nicolas Wolff is a 88 y.o. male admitted with a medical diagnosis of Acute on chronic combined systolic and diastolic congestive heart failure.  He presents with the following impairments/functional limitations:  weakness, gait instability, decreased lower extremity function, decreased ROM, edema, impaired functional mobilty, impaired self care skills, impaired skin, decreased safety awareness, impaired endurance, impaired balance, impaired cardiopulmonary response to activity . Patient able to come from supine <> sit with min assist, Sit <> stand with min assist. Gait with RW CG with verbal cues for safety ~ 70 ft. O2 sat with gait ~ 87% returned to 94% once seated ~ 2 minutes. Nursing made aware. Patient would benefit from Part B for therapy upon return to Nursing Home. .    Rehab Prognosis: Good; patient would benefit from acute skilled PT services to address these deficits and reach maximum level of function.    Recent Surgery: * No surgery found *      Plan:     During this hospitalization, patient to be seen 6 x/week to address the identified rehab impairments via gait training, therapeutic activities, therapeutic exercises and progress toward the following goals:    · Plan of Care Expires:  08/29/19    Subjective     Chief Complaint: wheezing  Patient/Family Comments/goals: go home  Pain/Comfort:  · Pain Rating 1: 0/10  · Pain Rating Post-Intervention 1: 0/10    Patients cultural, spiritual, Jehovah's witness conflicts given the current situation:      Living Environment:  Lives in nursing facility   Prior to admission, patients level of function needed assistance at times.  Equipment used at home: wheelchair,  hospital bed, walker, rolling.  DME owned (not currently used): none.  Upon discharge, patient will have assistance from staff.    Objective:     Communicated with primary nurse prior to session.  Patient found left sidelying with peripheral IV, bed alarm, telemetry  upon PT entry to room.    General Precautions: Standard, fall   Orthopedic Precautions:N/A   Braces: N/A     Exams:  · RLE ROM: WFL  · RLE Strength: 3/5 to 4/5  · LLE ROM: WFL  · LLE Strength: 3/5 to 4/5    Functional Mobility:  · Bed Mobility:     · Supine to Sit: minimum assistance  · Transfers:     · Sit to Stand:  contact guard assistance and minimum assistance with rolling walker  · Gait: 70 ft with RW CG to min assist   · Balance: fair to poor +      Therapeutic Activities and Exercises:   na    AM-PAC 6 CLICK MOBILITY  Total Score:19     Patient left up in chair with all lines intact, call button in reach and chair alarm on.    GOALS:   Multidisciplinary Problems     Physical Therapy Goals        Problem: Physical Therapy Goal    Goal Priority Disciplines Outcome Goal Variances Interventions   Physical Therapy Goal     PT, PT/OT Ongoing (interventions implemented as appropriate)     Description:  Goals to be met by: 2019     Patient will increase functional independence with mobility by performin. Supine to sit with Modified [place]  2. Sit to stand transfer with Modified [place]  3. Bed to chair transfer with Modified [place] using Rolling Walker  4. Gait  x 150 feet with Supervision using Rolling Walker.                       History:     Past Medical History:   Diagnosis Date    CHF (congestive heart failure)     Coronary artery disease     Coronary artery disease involving native coronary artery of native heart with angina pectoris 2019    Hypertension     Ischemic cardiomyopathy 2019    LVEF of 25% on 19.    LV dysfunction 2019    Nonrheumatic aortic valve stenosis 2019    S/P CABG x 5  10/29/2007    SVG to LAD, sequential to diagonal; SVG to ramus; SVG to OM1 and SVG to PDA.  No LIMA to LAD graft due to 80% left subclavian artery stenosis.    Stenosis of left subclavian artery 10/25/2007       Past Surgical History:   Procedure Laterality Date    BACK SURGERY      CORONARY ARTERY BYPASS GRAFT         Time Tracking:     PT Received On: 07/29/19  PT Start Time: 1014     PT Stop Time: 1037  PT Total Time (min): 23 min     Billable Minutes: Evaluation 10 and Gait Training 13      Krish Najera, PT  07/29/2019

## 2019-07-29 NOTE — PLAN OF CARE
Problem: Adult Inpatient Plan of Care  Goal: Plan of Care Review  Outcome: Ongoing (interventions implemented as appropriate)  POC reviewed with patient. Patient AAOx4 and denies any pain. Tele monitor in place reading NSR/SB. Plan was for patient to be discharged today but HR started sustaining in the 30s and got to 35. Physicians decided to keep patient another night, then discharge patient back to [facility] tomorrow. Cardio consulted. Bed alarm on, bed locked and low, side rails up x2, and call bell in reach. Patient verbalizes clear understanding of POC.

## 2019-07-29 NOTE — PLAN OF CARE
Problem: Physical Therapy Goal  Goal: Physical Therapy Goal  Goals to be met by: 2019     Patient will increase functional independence with mobility by performin. Supine to sit with Modified [place]  2. Sit to stand transfer with Modified [facility]  3. Bed to chair transfer with Modified [place] using Rolling Walker  4. Gait  x 150 feet with Supervision using Rolling Walker.     Outcome: Ongoing (interventions implemented as appropriate)  Recommend Back to Nursing facility with Part B for therapy  No DME needs apparent

## 2019-07-29 NOTE — PROGRESS NOTES
[facility] Medicine Resident Progress Note  Pt Name: Nicolas Wolff   Room: K419/K419 A  Admitted On: 2019  Today: 2019    Subjective:       Patient feels well this morning. Still having intermittent cough productive of clear sputum but denies chest pain or SOB. Reportedly had episode of slight confusion earlier this morning. [facility].     Objective:   Last 24 Hour Vital Signs:  BP  Min: 127/62  Max: 187/78  Temp  Av °F (36.1 °C)  Min: 96.3 °F (35.7 °C)  Max: 97.9 °F (36.6 °C)  Pulse  Av.3  Min: 41  Max: 48  Resp  Av.7  Min: 18  Max: 20  SpO2  Av.2 %  Min: 95 %  Max: 98 %  Weight  Av kg (189 lb 9.5 oz)  Min: 86 kg (189 lb 9.5 oz)  Max: 86 kg (189 lb 9.5 oz)  I/O last 3 completed shifts:  In: 975 [P.O.:975]  Out: 3035 [Urine:3035]    Physical Examination:     Gen: Alert, NAD.   Head: Atraumatic, normocephalic  Eyes: Pupils equal and reactive to light,extra-ocular eye movements intact  CV: RRR, II/VI mid-systolic murmur heard at right upper sternal border     Lungs: bilateral end-expiratory wheezing  Abd: Soft, non tender, non distended, bowel sounds present. No rebound tenderness or guarding present.    EXT: Bilateral 1+ pitting edema from feet to hips. Bilateral redness in both legs at mid shins, non tender, not warm.    Neuro: grossly normal    Skin: Warm and dry.  No jaundice noted.              Laboratory:    Recent Labs   Lab 19  1945 19  0600 19  0658 19  0406 19  0356   WBC 5.70 3.40*  3.40*  --  8.17 7.17    215  215  --  207 196   MCV 87 87  87  --  87 86   RDW 18.1* 19.4*  19.4*  --  18.4* 18.4*     --  136 135* 137   K 4.3  --  4.2 4.0 3.4*     --  101 100 99   CO2 21*  --  24 24 28   BUN 17  --  16 24* 24*   CREATININE 0.8  --  0.9 1.0 1.0     --  114* 102 83   CALCIUM 8.4*  --  8.7 8.4* 8.4*   PROT 6.3  --   --   --   --    ALBUMIN 3.3*  --   --   --   --    AST 29   --   --   --   --    ALKPHOS 104  --   --   --   --    ALT 21  --   --   --   --        Trended Cardiac Data:  Recent Labs   Lab 07/26/19  1945 07/27/19  0658   TROPONINI 0.029* 0.036*   BNP 3,183*  --      Other Results:  Radiology:  X-ray Chest Ap Portable    Result Date: 7/26/2019  EXAMINATION: XR CHEST AP PORTABLE CLINICAL HISTORY: CHF; TECHNIQUE: Single frontal view of the chest was performed. COMPARISON: 04/29/2019. FINDINGS: Monitoring EKG leads are present.  There are postoperative changes of prior median sternotomy.  The trachea is unremarkable.  There is stable enlargement of the cardiomediastinal silhouette.  There is no evidence of free air beneath the hemidiaphragms.  There are new moderate bilateral pleural effusions.  There is no evidence of a pneumothorax.  There is no evidence of pneumomediastinum.  There is pulmonary vascular congestion.  There are bibasilar airspace opacities.  There are extensive degenerative changes in the osseous structures.     Cardiomegaly with bilateral pleural effusions, pulmonary vascular congestion, and bibasilar airspace opacities, most consistent with decompensated CHF. Electronically signed by: Rasta Lagos MD Date:    07/26/2019 Time:    20:19      I have reviewed the above reports as well as previous records.    Current Medications:     Infusions:       Scheduled:   aspirin  81 mg Oral Daily    atorvastatin  40 mg Oral Daily    cetirizine  10 mg Oral Daily    clopidogrel  75 mg Oral Daily    enoxaparin  40 mg Subcutaneous Daily    fluticasone propionate  1 spray Each Nostril Daily    furosemide  40 mg Oral BID    lisinopril  20 mg Oral Daily    potassium chloride  20 mEq Oral Q2H    traZODone  25 mg Oral QHS        PRN:  acetaminophen, guaifenesin 100 mg/5 ml, ondansetron, pneumoc 13-edwin conj-dip cr(PF), ramelteon, sodium chloride 0.9%, sodium chloride 0.9%      Assessment:     Nicolas Wolff is a 88 y.o.male with  Patient Active Problem List    Diagnosis  Date Noted    Hypertension 07/27/2019    Shortness of breath 07/27/2019    Acute on chronic congestive heart failure     Acute on chronic combined systolic and diastolic congestive heart failure 07/26/2019    Normocytic anemia 07/26/2019    Coronary artery disease involving native coronary artery of native heart without angina pectoris 05/01/2019    LV dysfunction 05/01/2019    Ischemic cardiomyopathy 05/01/2019    Nonrheumatic aortic valve stenosis 05/01/2019    Vertebral artery occlusion, left     Vertigo     TIA (transient ischemic attack) 04/29/2019    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 Acute on chronic combined systolic and diastolic CHF  -pt reporting about 1 month hx of bilateral leg swelling, elevated JVD, 2-3 day history of SOB  -CXR (7/26/19) consistent with decompensated HF, BNP 3183, 3+ pitting edema from feet to thighs bilaterally clinically consistent with CHF exasperation   -Got 80mg Lasix IV in ED, continued lasix IV 80 mg BID during admission   -net negative 4.3L, transitioned to home Lasix 40 mg PO BID today, will re-evaluate need for further IV diuretics later today     2. Normocytic Anemia   -on admit H/H 10.2/31.2 MCV: 87  -iron studies mixed, will need repeat studies as outpatient   -daily CBC      3.  CAD s/p CABG  -resumed home medications: ASA 81 PO daily, clopidogrel 75 mg PO daily, Lipitor 40 mg daily     4. HLD  -continued home Lipitor 40 daily      5. HTN  -pt denies hx of HTN and not on any antihypertensive meds  -BP elevated around 172/74 on admission. Overnight BP got up to 220/87  -started Lisinopril 20 mg PO daily, will monitor and increase as needed    6. Severe Aortic Stenosis   -TTE 4/30/2019 significant for severe aortic stenosis (AV area 0.91 cm2)  -gently diuresed while admitted    Diet: Cardiac  PPx: enoxaparin  Dispo: pending PT/OT recs, possible discharge back to NH today    Parminder Williamson MD  [facility]  Internal Medicine -II

## 2019-07-29 NOTE — NURSING
Patient called about wanting to leave. Upon assessment patient was having hallucinations about people talking by his window and two women following him around. He said that I wouldn't be able to hear them myself. Dr. Gutiérrez notified.

## 2019-07-29 NOTE — NURSING
Notified by Dr. Williamson that patient will not be discharged today and will be staying another night for observation. Cardiology consult placed. Will continue to monitor patient.

## 2019-07-29 NOTE — PLAN OF CARE
Sent updated clinical information to [facility]- longterm       07/29/19 0949   Discharge Reassessment   Assessment Type Discharge Planning Reassessment   Provided patient/caregiver education on the expected discharge date and the discharge plan Yes   Discharge Plan A Return to nursing home   Post-Acute Status   Post-Acute Authorization Placement   Post-Acute Placement Status Additional Clinical Requested     Kayla Medrano, RN, CCM, CMSRN  RN Transition Navigator  [phone]

## 2019-07-29 NOTE — PLAN OF CARE
07/29/19 1516   Post-Acute Status   Post-Acute Authorization Placement   Post-Acute Placement Status Pending Post-Acute Clinical Review

## 2019-07-29 NOTE — PLAN OF CARE
Problem: Occupational Therapy Goal  Goal: Occupational Therapy Goal  Goals to be met by: 08/29/2019      Patient will increase functional independence with ADLs by performing:    UE Dressing with Stand-by Assistance.  LE Dressing with Minimal Assistance.  Grooming while standing with Supervision and Stand-by Assistance.  Toileting from toilet with Supervision and Stand-by Assistance for hygiene and clothing management.   Step transfer with Supervision.  Toilet transfer to toilet with Supervision.  Increased functional strength to WFL for self care.  Upper extremity exercise program x8 reps per handout, with supervision.    Outcome: Ongoing (interventions implemented as appropriate)  Pt would benefit from continued OT to address deficits in self care and functional mobility. Recommending return to intermediate NH with part B therapies; DME needs likely none

## 2019-07-29 NOTE — DISCHARGE SUMMARY
\[facility]\"" Hospital Medicine Discharge Summary    Primary Team: \[facility]\"" Hospitalist Team B  Attending Physician: Momo Pichardo MD  Resident: Clay  Intern: Janet    Date of Admit: 7/26/2019  Date of Discharge: 7/29/2019    Discharge to: [facility]  Condition: stable    Discharge Diagnoses     Patient Active Problem List   Diagnosis    TIA (transient ischemic attack)    Vertebral artery occlusion, left    Vertigo    Coronary artery disease involving native coronary artery of native heart without angina pectoris    S/P CABG x 5    Stenosis of left subclavian artery    LV dysfunction    Ischemic cardiomyopathy    Nonrheumatic aortic valve stenosis    Acute on chronic combined systolic and diastolic congestive heart failure    Normocytic anemia    Hypertension    Acute on chronic congestive heart failure    Shortness of breath       Consultants and Procedures     Consultants:  none    Procedures:   none    Imaging:  X-ray Chest Ap Portable    Result Date: 7/26/2019  EXAMINATION: XR CHEST AP PORTABLE CLINICAL HISTORY: CHF; TECHNIQUE: Single frontal view of the chest was performed. COMPARISON: 04/29/2019. FINDINGS: Monitoring EKG leads are present.  There are postoperative changes of prior median sternotomy.  The trachea is unremarkable.  There is stable enlargement of the cardiomediastinal silhouette.  There is no evidence of free air beneath the hemidiaphragms.  There are new moderate bilateral pleural effusions.  There is no evidence of a pneumothorax.  There is no evidence of pneumomediastinum.  There is pulmonary vascular congestion.  There are bibasilar airspace opacities.  There are extensive degenerative changes in the osseous structures.     Cardiomegaly with bilateral pleural effusions, pulmonary vascular congestion, and bibasilar airspace opacities, most consistent with decompensated CHF. Electronically signed by: Rasta Lagos MD Date:    07/26/2019 Time:    20:19      Brief History of  Present Illness      Patient presented to ED after progressive leg swelling and SOB and was found to be in a heart failure exacerbation. He was initially diuresed cautiously with 80 mg IV Lasix BID due to his underlying severe aortic stenosis. Patient was also persistently hypertensive, so he was started on lisinopril. Patient was net negative 4.3 L during the admission and transitioned to PO Lasix on the last day. His home Lasix dose is being increased at discharge. He will need close PCP follow-up to further titrate antihypertensives.    For the full HPI please refer to the History & Physical from this admission.    Hospital Course By Problem with Pertinent Findings     1. Acute on chronic combined systolic and diastolic CHF  -pt reported about 1 month hx of bilateral leg swelling, 2-3 day history of SOB  -CXR (7/26/19) consistent with decompensated HF, BNP 3183, JVP elevated, 3+ pitting edema from feet to thighs bilaterally clinically consistent with CHF exacerbation  -Got 80mg Lasix IV in ED, continued lasix IV 80 mg BID during admission   -net negative 4.3L, transitioned to home Lasix 40 mg PO BID -increased home Lasix dose to 80 mg BID at discharge     2. Normocytic Anemia   -on admit H/H 10.2/31.2 MCV: 87  -iron studies mixed, will need repeat studies as outpatient      3.  CAD s/p CABG  -resumed home medications: ASA 81 PO daily, clopidogrel 75 mg PO daily, Lipitor 40 mg daily     4. HLD  -continued home Lipitor 40 daily      5. HTN  -pt denied hx of HTN and was not on any antihypertensive meds  -BP elevated around 172/74 on admission. Overnight BP got up to 220/87  -started Lisinopril 20 mg PO daily, will need close primary care follow-up to further titrate meds     6. Severe Aortic Stenosis   -TTE 4/30/2019 significant for severe aortic stenosis (AV area 0.91 cm2)  -gently diuresed while admitted    Discharge Medications      Nicolas Wolff   Home Medication Instructions MIGUEL:[id number]    Printed  on:07/29/19 1406   Medication Information                      aspirin (ECOTRIN) 81 MG EC tablet  Take 1 tablet (81 mg total) by mouth once daily.             atorvastatin (LIPITOR) 40 MG tablet  Take 1 tablet (40 mg total) by mouth once daily.             carvedilol (COREG) 3.125 MG tablet  Take 1 tablet (3.125 mg total) by mouth 2 (two) times daily with meals.             clopidogrel (PLAVIX) 75 mg tablet  Take 1 tablet (75 mg total) by mouth once daily.             diphenhydrAMINE (BENADRYL) 25 mg capsule  Take 25 mg by mouth every 6 (six) hours as needed for Itching.             fluticasone propionate (FLONASE) 50 mcg/actuation nasal spray  1 spray by Each Nostril route once daily.             furosemide (LASIX) 40 MG tablet  Take 2 tablets (80 mg total) by mouth 2 (two) times daily.             lisinopril (PRINIVIL,ZESTRIL) 20 MG tablet  Take 1 tablet (20 mg total) by mouth once daily.             loratadine (CLARITIN REDITABS) 10 mg dissolvable tablet  Take 10 mg by mouth once daily.             multivitamin capsule  Take 1 capsule by mouth once daily.             nystatin, bulk, 100 million unit Powd  by Misc.(Non-Drug; Combo Route) route.             potassium chloride (MICRO-K) 10 MEQ CpSR  Take 10 mEq by mouth once.             sodium chloride 1 gram tablet  Take 1 g by mouth 3 (three) times daily.             traZODone (DESYREL) 50 MG tablet  Take 50 mg by mouth every evening.                 Discharge Information:   Diet:  cardiac    Physical Activity:  As tolerated             Instructions:  1. Take all medications as prescribed  2. Keep all follow-up appointments  3. Return to the hospital or call your primary care physicians if any worsening symptoms such as fever, chest pain, shortness of breath, return of symptoms, or any other concerns.    Follow-Up Appointments:  PCP    Parminder Williamson MD  [facility], [facility]

## 2019-07-29 NOTE — PLAN OF CARE
Problem: Adult Inpatient Plan of Care  Goal: Plan of Care Review  Outcome: Ongoing (interventions implemented as appropriate)  Patient remained in bed with bed alarm on, yellow socks and fall risk band. Bed in lowest position, wheels locked and call bell within reach. Care of plan reviewed with patient, verbalized understanding.  Vital signs remained stable. SB on telemetry. No complaints of pain. Oriented X4. Iv remained intact. Will continue to monitor.

## 2019-07-29 NOTE — PLAN OF CARE
Informed by MD that patient will not be discharged. HR dropped in 30's. Cardiology consult pending       07/29/19 4887   Post-Acute Status   Post-Acute Authorization Placement   Discharge Delays (!) Change in Medical Condition     Kayla Medrano, RN, CCM, CMSRN  RN Transition Navigator  [phone]

## 2019-07-30 LAB
ANION GAP SERPL CALC-SCNC: 9 MMOL/L (ref 8–16)
BASOPHILS # BLD AUTO: 0.01 K/UL (ref 0–0.2)
BASOPHILS NFR BLD: 0.2 % (ref 0–1.9)
BUN SERPL-MCNC: 21 MG/DL (ref 8–23)
CALCIUM SERPL-MCNC: 8.7 MG/DL (ref 8.7–10.5)
CHLORIDE SERPL-SCNC: 98 MMOL/L (ref 95–110)
CO2 SERPL-SCNC: 31 MMOL/L (ref 23–29)
CREAT SERPL-MCNC: 0.8 MG/DL (ref 0.5–1.4)
DIFFERENTIAL METHOD: ABNORMAL
EOSINOPHIL # BLD AUTO: 0.2 K/UL (ref 0–0.5)
EOSINOPHIL NFR BLD: 2.8 % (ref 0–8)
ERYTHROCYTE [DISTWIDTH] IN BLOOD BY AUTOMATED COUNT: 18.7 % (ref 11.5–14.5)
EST. GFR  (AFRICAN AMERICAN): >60 ML/MIN/1.73 M^2
EST. GFR  (NON AFRICAN AMERICAN): >60 ML/MIN/1.73 M^2
GLUCOSE SERPL-MCNC: 92 MG/DL (ref 70–110)
HCT VFR BLD AUTO: 32.6 % (ref 40–54)
HGB BLD-MCNC: 10.6 G/DL (ref 14–18)
LYMPHOCYTES # BLD AUTO: 1.4 K/UL (ref 1–4.8)
LYMPHOCYTES NFR BLD: 22.3 % (ref 18–48)
MCH RBC QN AUTO: 28.2 PG (ref 27–31)
MCHC RBC AUTO-ENTMCNC: 32.5 G/DL (ref 32–36)
MCV RBC AUTO: 87 FL (ref 82–98)
MONOCYTES # BLD AUTO: 0.7 K/UL (ref 0.3–1)
MONOCYTES NFR BLD: 10.8 % (ref 4–15)
NEUTROPHILS # BLD AUTO: 3.9 K/UL (ref 1.8–7.7)
NEUTROPHILS NFR BLD: 63.9 % (ref 38–73)
PLATELET # BLD AUTO: 191 K/UL (ref 150–350)
PMV BLD AUTO: 10.4 FL (ref 9.2–12.9)
POTASSIUM SERPL-SCNC: 3.8 MMOL/L (ref 3.5–5.1)
RBC # BLD AUTO: 3.76 M/UL (ref 4.6–6.2)
SODIUM SERPL-SCNC: 138 MMOL/L (ref 136–145)
WBC # BLD AUTO: 6.18 K/UL (ref 3.9–12.7)

## 2019-07-30 PROCEDURE — 93010 ELECTROCARDIOGRAM REPORT: CPT | Mod: ,,, | Performed by: INTERNAL MEDICINE

## 2019-07-30 PROCEDURE — 25000003 PHARM REV CODE 250: Performed by: STUDENT IN AN ORGANIZED HEALTH CARE EDUCATION/TRAINING PROGRAM

## 2019-07-30 PROCEDURE — 93005 ELECTROCARDIOGRAM TRACING: CPT

## 2019-07-30 PROCEDURE — 63600175 PHARM REV CODE 636 W HCPCS: Performed by: STUDENT IN AN ORGANIZED HEALTH CARE EDUCATION/TRAINING PROGRAM

## 2019-07-30 PROCEDURE — 80048 BASIC METABOLIC PNL TOTAL CA: CPT

## 2019-07-30 PROCEDURE — 93010 EKG 12-LEAD: ICD-10-PCS | Mod: ,,, | Performed by: INTERNAL MEDICINE

## 2019-07-30 PROCEDURE — 97116 GAIT TRAINING THERAPY: CPT

## 2019-07-30 PROCEDURE — 36415 COLL VENOUS BLD VENIPUNCTURE: CPT

## 2019-07-30 PROCEDURE — 96372 THER/PROPH/DIAG INJ SC/IM: CPT

## 2019-07-30 PROCEDURE — 85025 COMPLETE CBC W/AUTO DIFF WBC: CPT

## 2019-07-30 PROCEDURE — 97535 SELF CARE MNGMENT TRAINING: CPT

## 2019-07-30 PROCEDURE — 94761 N-INVAS EAR/PLS OXIMETRY MLT: CPT

## 2019-07-30 PROCEDURE — 11000001 HC ACUTE MED/SURG PRIVATE ROOM

## 2019-07-30 RX ADMIN — ENOXAPARIN SODIUM 40 MG: 100 INJECTION SUBCUTANEOUS at 08:07

## 2019-07-30 RX ADMIN — ASPIRIN 81 MG: 81 TABLET, COATED ORAL at 10:07

## 2019-07-30 RX ADMIN — FLUTICASONE PROPIONATE 50 MCG: 50 SPRAY, METERED NASAL at 10:07

## 2019-07-30 RX ADMIN — CETIRIZINE HYDROCHLORIDE 10 MG: 10 TABLET, FILM COATED ORAL at 10:07

## 2019-07-30 RX ADMIN — TRAZODONE HYDROCHLORIDE 25 MG: 50 TABLET ORAL at 08:07

## 2019-07-30 RX ADMIN — LISINOPRIL 20 MG: 20 TABLET ORAL at 10:07

## 2019-07-30 RX ADMIN — ATORVASTATIN CALCIUM 40 MG: 40 TABLET, FILM COATED ORAL at 10:07

## 2019-07-30 RX ADMIN — FUROSEMIDE 80 MG: 40 TABLET ORAL at 06:07

## 2019-07-30 RX ADMIN — CLOPIDOGREL BISULFATE 75 MG: 75 TABLET ORAL at 10:07

## 2019-07-30 RX ADMIN — GUAIFENESIN 200 MG: 200 SOLUTION ORAL at 08:07

## 2019-07-30 RX ADMIN — FUROSEMIDE 80 MG: 40 TABLET ORAL at 10:07

## 2019-07-30 NOTE — CONSULTS
Reason for consult: bradycardia     History of present illness:   Nicolas Wolff is a 88 y.o. year old male with a past medical history of CAD s/p CABG x5 in 2007, combined systolic and diastolic heart failure with EF 25%, severe vs psuedo-severe aortic stenosis and hypertension who was admitted from [facility] on 7/26/19 for acute heart failure exacerbation. Diuresed well with IV Lasix and was ready for discharge yesterday when he was noted to be bradycardic down to the low 30s on telemetry. He has been bradycardic during his admission with HR ranging 40-60. He has been asymptomatic, denying any dizziness, lightheadedness or syncopal episodes. Denies chest pain but reports significant dyspnea on exertion, only able to walk 10-15ft. Reports 1PPD smoking history for 30 years, quit 37 years ago for social reasons.    Patient underwent CABG x5 in 2007 by Dr Gustafson.  He had a sequential vein graft to the LAD and diagonal, SVG to ramus, SVG to OM1 and SVG to PDA.  The LIMA was not used due to 80% left subclavian stenosis.       Past Medical History:   Diagnosis Date    CHF (congestive heart failure)     Coronary artery disease     Coronary artery disease involving native coronary artery of native heart with angina pectoris 5/1/2019    Hypertension     Ischemic cardiomyopathy 5/1/2019    LVEF of 25% on 4/29/19.   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  No LIMA to LAD graft due to 80% left subclavian artery stenosis.    Stenosis of left subclavian artery 10/25/2007     Medications:   aspirin  81 mg Oral Daily    atorvastatin  40 mg Oral Daily    cetirizine  10 mg Oral Daily    clopidogrel  75 mg Oral Daily    enoxaparin  40 mg Subcutaneous Daily    fluticasone propionate  1 spray Each Nostril Daily    furosemide  80 mg Oral BID    lisinopril  20 mg Oral Daily     traZODone  25 mg Oral QHS     Allergies:  Review of patient's allergies indicates:  No Known Allergies    Social history:  Social History     Tobacco Use    Smoking status: Former Smoker    Smokeless tobacco: Never Used   Substance Use Topics    Alcohol use: No    Drug use: No     Family history:  family history is not on file.    Surgical history:  Past Surgical History:   Procedure Laterality Date    BACK SURGERY      CORONARY ARTERY BYPASS GRAFT       Review of systems:  Rest is negative except as in HPI.    Physical Exam:  Temp:  [96.4 °F (35.8 °C)-97.5 °F (36.4 °C)] 96.4 °F (35.8 °C)  Pulse:  [37-65] 46  Resp:  [18] 18  SpO2:  [93 %-97 %] 97 %  BP: (142-162)/(58-72) 161/70  General: No acute distress, awake, alert, and oriented x 3  Chest: scattered wheezes bilaterally   CVS: bradycardic, normal rhythm, III/VI crescendo decrescendo systolic murmur at RUSB with radiation to carotids, JVP 10 cm, radial, femoral, and DP 2+ bilaterally  Ext: 2+ pitting edema to knees     Lab Results   Component Value Date    TROPONINI 0.036 (H) 2019    TROPONINI 0.029 (H) 2019    TROPONINI 0.048 (H) 2019    BNP 3,183 (H) 2019    INR 1.1 2019    HGB 10.6 (L) 2019    HCT 32.6 (L) 2019     2019    CREATININE 0.8 2019    K 3.8 2019       EK19       Echo: 19   · Severe aortic valve stenosis (AS).  · Aortic valve area is 0.91 cm2; peak velocity is 2.06 m/s; mean gradient is 10.02 mmHg. Consider coronary calcium score to further assess distinguish pseudo-severe AS from low flow AS.  · Severely decreased left ventricular systolic function. The estimated ejection fraction is 25%  · Eccentric left ventricular hypertrophy.  · Grade I (mild) left ventricular diastolic dysfunction consistent with impaired relaxation.  · Severe left atrial enlargement.  · The estimated PA systolic pressure is 25 mm Hg  · Normal right ventricular systolic function.  · Mild  tricuspid regurgitation.  · Normal central venous pressure (3 mm Hg).    Assessment/Plan:  Nicolas Wolff is a 88 y.o. year old male with a past medical history of CAD s/p CABG x5 in 2007, combined systolic and diastolic heart failure with EF 25%, severe vs psuedo-severe aortic stenosis who was admitted for heart failure exacerbation, now with concern for Mobitz II heart block.    Bradycardia  -telemetry with what appears to be Mobitz I vs 2:1 AV block   -light carotid palpation (done due to carotid disease) at bedside did not produce any pauses  -given asymptomatic, will hold off on pacemaker placement  -discontinue AV perico blocking agents     Heart failure with combined systolic and diastolic heart failure; EF 25%  -discontinue home carvedilol   -continue other heart failure medications, can add Bidil (if blood pressure tolerates) for mortality benefit   -EF 25% in April, if EF has not improved since then on GDMT, technically a candidate for ICD but given age, benefit unclear     Aortic Stenosis vs Pseudo-stenosis   -echo during previous admission noted aortic valve area of 0.91mc2 but with peak velocity of 2.06 m/s and mean gradient of 10.02 mmHg  -suspect pseudo-stenosis (from low EF) as aortic valve opens well on echo   -would require dobutamine stress echo to confirm but speaking to patient, he does not want a TAVR    Subclavian stenosis  -ensure BP checks are done on right arm       Daniella Hopkins, HO-II  [facility]   [facility]

## 2019-07-30 NOTE — PT/OT/SLP PROGRESS
Physical Therapy Treatment    Patient Name:  Nicolas Wolff   MRN:  [id number]    Recommendations:     Discharge Recommendations:  nursing facility, basic   Discharge Equipment Recommendations: none   Barriers to discharge: None    Assessment:     Nicolas Wolff is a 88 y.o. male admitted with a medical diagnosis of Acute on chronic combined systolic and diastolic congestive heart failure.  He presents with the following impairments/functional limitations:  weakness, gait instability, impaired functional mobilty, impaired self care skills, pain, decreased lower extremity function, decreased ROM, impaired balance, impaired endurance, impaired cardiopulmonary response to activity . Patient with c/o pain R knee with wt bearing and at rest. Gait with RW ~ 20 ft and SBA to CG. Limited 2/2 to pain R knee..    Rehab Prognosis: Good; patient would benefit from acute skilled PT services to address these deficits and reach maximum level of function.    Recent Surgery: * No surgery found *      Plan:     During this hospitalization, patient to be seen 6 x/week to address the identified rehab impairments via gait training, therapeutic activities, therapeutic exercises and progress toward the following goals:    · Plan of Care Expires:  08/29/19    Subjective     Chief Complaint: pain  Patient/Family Comments/goals: go back to Nursing Home  Pain/Comfort:  · Pain Rating 1: 0/10  · Pain Rating Post-Intervention 1: 0/10      Objective:     Communicated with primary nurse prior to session.  Patient found up in chair with telemetry upon PT entry to room.     General Precautions: Standard, fall   Orthopedic Precautions:N/A   Braces: N/A     Functional Mobility:  · Transfers:     · Sit to Stand:  stand by assistance with rolling walker  · Gait: 20 ft with RW CG to SBA  · Balance: fair to poor + with AD      AM-PAC 6 CLICK MOBILITY  Turning over in bed (including adjusting bedclothes, sheets and blankets)?: 4  Sitting down on and  standing up from a chair with arms (e.g., wheelchair, bedside commode, etc.): 4  Moving from lying on back to sitting on the side of the bed?: 3  Moving to and from a bed to a chair (including a wheelchair)?: 3  Need to walk in hospital room?: 3  Climbing 3-5 steps with a railing?: 2  Basic Mobility Total Score: 19       Therapeutic Activities and Exercises:   na    Patient left up in chair with all lines intact, call button in reach and chair alarm on..    GOALS:   Multidisciplinary Problems     Physical Therapy Goals        Problem: Physical Therapy Goal    Goal Priority Disciplines Outcome Goal Variances Interventions   Physical Therapy Goal     PT, PT/OT Ongoing (interventions implemented as appropriate)     Description:  Goals to be met by: 2019     Patient will increase functional independence with mobility by performin. Supine to sit with Modified Ward  2. Sit to stand transfer with Modified Ward  3. Bed to chair transfer with Modified Ward using Rolling Walker  4. Gait  x 150 feet with Supervision using Rolling Walker.                       Time Tracking:     PT Received On: 19  PT Start Time: 1025     PT Stop Time: 1045  PT Total Time (min): 20 min     Billable Minutes: Gait Training 15    Treatment Type: Treatment  PT/PTA: PT           Krish Najera, PT  2019

## 2019-07-30 NOTE — PLAN OF CARE
Problem: Occupational Therapy Goal  Goal: Occupational Therapy Goal  Goals to be met by: 08/29/2019      Patient will increase functional independence with ADLs by performing:    UE Dressing with Stand-by Assistance.  LE Dressing with Minimal Assistance.  Grooming while standing with Supervision and Stand-by Assistance.  Toileting from toilet with Supervision and Stand-by Assistance for hygiene and clothing management.   Step transfer with Supervision.  Toilet transfer to toilet with Supervision.  Increased functional strength to WFL for self care.  Upper extremity exercise program x8 reps per handout, with supervision.     Outcome: Ongoing (interventions implemented as appropriate)  Pt progressing well towards goals. Cont OT POC

## 2019-07-30 NOTE — NURSING
Notified by PCT that patient has a automatic BP of 63/31. Patient asymptomatic and resting in chair. Dr. Pichardo notified and at bedside to assess patient. HR sustaining in the 40s. Patient placed back in bed. Dr. Mcduffie notified at at bedside. Physician states that BP in left arm is not accurate d/t stenosis present. Automatic BP checked in left arm and is 179/68. No acute distress noted. BP no longer to be taken in the left arm. PCT and staff notified.

## 2019-07-30 NOTE — PROGRESS NOTES
[facility] Resident Progress Note  Pt Name: Nicolas Wolff   Room: K419/K419 A  Admitted On: 2019  Today: 2019    Subjective:       Patient states he feels well this morning and has been urinating well. He has had no CP/SOB. He has had a continuous runny nose. Is HR overnight ranged from the 30s-50s but he complains of no symptoms.     Objective:   Last 24 Hour Vital Signs:  BP  Min: 142/58  Max: 162/67  Temp  Av.9 °F (36.1 °C)  Min: 96.4 °F (35.8 °C)  Max: 97.5 °F (36.4 °C)  Pulse  Av.3  Min: 37  Max: 65  Resp  Av  Min: 18  Max: 18  SpO2  Av.7 %  Min: 93 %  Max: 97 %  Weight  Av.4 kg (183 lb 13.8 oz)  Min: 83.4 kg (183 lb 13.8 oz)  Max: 83.4 kg (183 lb 13.8 oz)  I/O last 3 completed shifts:  In: 788 [P.O.:788]  Out: 3085 [Urine:3085]    Physical Examination:     Gen: Alert, NAD.   Head: Atraumatic, normocephalic  Eyes: Pupils equal and reactive to light,extra-ocular eye movements intact  CV: RRR, II/VI mid-systolic murmur heard at right upper sternal border     Lungs: bilateral end-expiratory wheezing. Decreased breath sounds in RLL and LLL  Abd: Soft, non tender, non distended, bowel sounds present. No rebound tenderness or guarding present.    EXT: Bilateral 1+ pitting edema from feet to hips. Bilateral redness in both legs at mid shins, non tender, not warm.    Neuro: grossly normal    Skin: Warm and dry.  No jaundice noted.              Laboratory:    Recent Labs   Lab 19  1945  19  0406 19  0356 19  0334   WBC 5.70   < > 8.17 7.17 6.18      < > 207 196 191   MCV 87   < > 87 86 87   RDW 18.1*   < > 18.4* 18.4* 18.7*      < > 135* 137 138   K 4.3   < > 4.0 3.4* 3.8      < > 100 99 98   CO2 21*   < > 24 28 31*   BUN 17   < > 24* 24* 21   CREATININE 0.8   < > 1.0 1.0 0.8      < > 102 83 92   CALCIUM 8.4*   < > 8.4* 8.4* 8.7   PROT 6.3  --   --   --   --    ALBUMIN 3.3*  --   --   --   --    AST 29  --   --   --   --     ALKPHOS 104  --   --   --   --    ALT 21  --   --   --   --     < > = values in this interval not displayed.       Trended Cardiac Data:  Recent Labs   Lab 07/26/19  1945 07/27/19  0658   TROPONINI 0.029* 0.036*   BNP 3,183*  --      Other Results:  Radiology:  X-ray Chest Ap Portable    Result Date: 7/26/2019  EXAMINATION: XR CHEST AP PORTABLE CLINICAL HISTORY: CHF; TECHNIQUE: Single frontal view of the chest was performed. COMPARISON: 04/29/2019. FINDINGS: Monitoring EKG leads are present.  There are postoperative changes of prior median sternotomy.  The trachea is unremarkable.  There is stable enlargement of the cardiomediastinal silhouette.  There is no evidence of free air beneath the hemidiaphragms.  There are new moderate bilateral pleural effusions.  There is no evidence of a pneumothorax.  There is no evidence of pneumomediastinum.  There is pulmonary vascular congestion.  There are bibasilar airspace opacities.  There are extensive degenerative changes in the osseous structures.     Cardiomegaly with bilateral pleural effusions, pulmonary vascular congestion, and bibasilar airspace opacities, most consistent with decompensated CHF. Electronically signed by: Rasta Lagos MD Date:    07/26/2019 Time:    20:19      I have reviewed the above reports as well as previous records.    Current Medications:     Infusions:       Scheduled:   aspirin  81 mg Oral Daily    atorvastatin  40 mg Oral Daily    cetirizine  10 mg Oral Daily    clopidogrel  75 mg Oral Daily    enoxaparin  40 mg Subcutaneous Daily    fluticasone propionate  1 spray Each Nostril Daily    furosemide  80 mg Oral BID    lisinopril  20 mg Oral Daily    traZODone  25 mg Oral QHS        PRN:  acetaminophen, guaifenesin 100 mg/5 ml, ondansetron, pneumoc 13-edwin conj-dip cr(PF), ramelteon, sodium chloride 0.9%, sodium chloride 0.9%      Assessment:     Nicolas Wolff is a 88 y.o.male with  Patient Active Problem List    Diagnosis Date Noted     Hypertension 07/27/2019    Shortness of breath 07/27/2019    Acute on chronic congestive heart failure     Acute on chronic combined systolic and diastolic congestive heart failure 07/26/2019    Normocytic anemia 07/26/2019    Coronary artery disease involving native coronary artery of native heart without angina pectoris 05/01/2019    LV dysfunction 05/01/2019    Ischemic cardiomyopathy 05/01/2019    Nonrheumatic aortic valve stenosis 05/01/2019    Vertebral artery occlusion, left     Vertigo     TIA (transient ischemic attack) 04/29/2019    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 Acute on chronic combined systolic and diastolic CHF  -pt reporting about 1 month hx of bilateral leg swelling, elevated JVD, 2-3 day history of SOB  -CXR (7/26/19) consistent with decompensated HF, BNP 3183, 3+ pitting edema from feet to thighs bilaterally clinically consistent with CHF exasperation   -Got 80mg Lasix IV in ED, continued lasix IV 80 mg BID during admission   -net negative 5.5L, transitioned to home Lasix 40 mg PO BID yesterday. Tolerating well.   - Bradycardic in 30s yesterday after initial plan of discharge. Held discharge. Cardiology consulted to r/o heart block.  - HR overnight 30-50s. Last check- /77 HR 51     2. Normocytic Anemia   -on admit H/H 10.2/31.2 MCV: 87  -iron studies mixed, will need repeat studies as outpatient   -daily CBC      3.  CAD s/p CABG  -resumed home medications: ASA 81 PO daily, clopidogrel 75 mg PO daily, Lipitor 40 mg daily     4. HLD  -continued home Lipitor 40 daily      5. HTN  -pt denies hx of HTN and not on any antihypertensive meds  -BP elevated around 172/74 on admission. Overnight BP got up to 220/87  -started Lisinopril 20 mg PO daily, will monitor and increase as needed    6. Severe Aortic Stenosis   -TTE 4/30/2019 significant for severe aortic stenosis (AV area 0.91 cm2)  -gently diuresed while  admitted    Diet: Cardiac  PPx: enoxaparin  Dispo: Pend card recs    Marlon Gonzales MD  [facility]

## 2019-07-30 NOTE — PT/OT/SLP PROGRESS
Occupational Therapy   Treatment    Name: Nicolas Wolff  MRN: [id number]  Admitting Diagnosis:  Acute on chronic combined systolic and diastolic congestive heart failure       Recommendations:     Discharge Recommendations: nursing facility, basic(with part B therapies)  Discharge Equipment Recommendations:  none  Barriers to discharge:  None    Assessment:     Nicolas Wolff is a 88 y.o. male with a medical diagnosis of Acute on chronic combined systolic and diastolic congestive heart failure.  He presents with deconditioning, c/o R knee pain this session. Performance deficits affecting function are weakness, impaired endurance, impaired self care skills, impaired functional mobilty, gait instability, impaired balance, impaired cognition, decreased coordination, decreased upper extremity function, decreased lower extremity function, decreased safety awareness, pain, impaired coordination, impaired fine motor, edema, impaired cardiopulmonary response to activity.     Rehab Prognosis:  ditioning; patient would benefit from acute skilled OT services to address these deficits and reach maximum level of function.       Plan:     Patient to be seen 5 x/week to address the above listed problems via self-care/home management, therapeutic activities, therapeutic exercises  · Plan of Care Expires: 08/29/19  · Plan of Care Reviewed with: patient    Subjective     Pain/Comfort:  · Pain Rating 1: 5/10  · Location - Side 1: Right  · Location - Orientation 1: generalized  · Location 1: knee  · Pain Addressed 1: Reposition, Distraction, Cessation of Activity, Nurse notified  · Pain Rating Post-Intervention 1: 5/10    Objective:     Communicated with: lesley prior to session.  Patient found HOB elevated with bed alarm, telemetry, peripheral IV upon OT entry to room.    General Precautions: Standard, fall   Orthopedic Precautions:N/A   Braces: N/A     Occupational Performance:     Bed Mobility:    · Patient completed Rolling/Turning to  Right with minimum assistance  · Patient completed Scooting/Bridging with minimum assistance and moderate assistance  · Patient completed Supine to Sit with minimum assistance     Functional Mobility/Transfers:  · Patient completed Sit <> Stand Transfer with minimum assistance  with  rolling walker   · Patient completed Toilet Transfer Step Transfer technique with contact guard assistance for ambulation, mod A for sit<>stand 2/2 low height of toilet with  rolling walker  Functional Mobility: Pt with fair to fair- dynamic seated and standing balance.      Activities of Daily Living:  · Toileting: stand by assistance for pericare seated on toilet      [facility] 6 Click ADL: 17    Treatment & Education:  Pt educated on role of OT and POC.   Pt performing skills as listed above.   Pt with limited endurance in therapies this date; began coughing at end of session; RN notified of pr request for cough medicine. Pt declined request for pain medicine 2/2 R knee pain. R knee bandaged with ACE wrap below patella upon OT entrance and remained throughout session    Patient left up in chair with all lines intact, call button in reach, chair alarm on and nsg notifiedEducation:      GOALS:   Multidisciplinary Problems     Occupational Therapy Goals        Problem: Occupational Therapy Goal    Goal Priority Disciplines Outcome Interventions   Occupational Therapy Goal     OT, PT/OT Ongoing (interventions implemented as appropriate)    Description:  Goals to be met by: 08/29/2019      Patient will increase functional independence with ADLs by performing:    UE Dressing with Stand-by Assistance.  LE Dressing with Minimal Assistance.  Grooming while standing with Supervision and Stand-by Assistance.  Toileting from toilet with Supervision and Stand-by Assistance for hygiene and clothing management.   Step transfer with Supervision.  Toilet transfer to toilet with Supervision.  Increased functional strength to WFL for self care.  Upper  extremity exercise program x8 reps per handout, with supervision.                      Time Tracking:     OT Date of Treatment: 07/30/19  OT Start Time: 1329  OT Stop Time: 1345  OT Total Time (min): 16 min    Billable Minutes:Self Care/Home Management 16    Nanette Simmons OT  7/30/2019

## 2019-07-30 NOTE — PLAN OF CARE
Problem: Adult Inpatient Plan of Care  Goal: Plan of Care Review  Outcome: Ongoing (interventions implemented as appropriate)  Patient remained in bed with bed alarm on, yellow socks and fall risk band. Bed in lowest position, wheels locked and call bell within reach. Care of plan reviewed with patient, verbalized understanding.  Vital signs remained stable. SB with mobitz 1 on telemetry. No complaints of pain. Oriented X4. Iv remained intact. Will continue to monitor.

## 2019-07-30 NOTE — PLAN OF CARE
Problem: Physical Therapy Goal  Goal: Physical Therapy Goal  Goals to be met by: 2019     Patient will increase functional independence with mobility by performin. Supine to sit with Modified [place]  2. Sit to stand transfer with Modified [place]  3. Bed to chair transfer with Modified [place] using Rolling Walker  4. Gait  x 150 feet with Supervision using Rolling Walker.      Outcome: Ongoing (interventions implemented as appropriate)  Recommend back to Nursing Home with Part B for therapy

## 2019-07-30 NOTE — NURSING
Dr. Gutiérrez notified about harman and Kayla block. Vital signs stable patient symptomatic. Said to keep informed informed.

## 2019-07-31 VITALS
HEIGHT: 68 IN | TEMPERATURE: 98 F | BODY MASS INDEX: 27.87 KG/M2 | WEIGHT: 183.88 LBS | RESPIRATION RATE: 18 BRPM | OXYGEN SATURATION: 96 % | SYSTOLIC BLOOD PRESSURE: 148 MMHG | DIASTOLIC BLOOD PRESSURE: 70 MMHG | HEART RATE: 65 BPM

## 2019-07-31 PROBLEM — Z71.89 COUNSELING REGARDING ADVANCED CARE PLANNING AND GOALS OF CARE: Status: ACTIVE | Noted: 2019-07-31

## 2019-07-31 PROBLEM — Z71.89 GOALS OF CARE, COUNSELING/DISCUSSION: Status: ACTIVE | Noted: 2019-07-31

## 2019-07-31 PROBLEM — Z51.5 PALLIATIVE CARE ENCOUNTER: Status: ACTIVE | Noted: 2019-07-31

## 2019-07-31 LAB
ANION GAP SERPL CALC-SCNC: 9 MMOL/L (ref 8–16)
BASOPHILS # BLD AUTO: 0.01 K/UL (ref 0–0.2)
BASOPHILS NFR BLD: 0.2 % (ref 0–1.9)
BUN SERPL-MCNC: 17 MG/DL (ref 8–23)
CALCIUM SERPL-MCNC: 8.5 MG/DL (ref 8.7–10.5)
CHLORIDE SERPL-SCNC: 95 MMOL/L (ref 95–110)
CO2 SERPL-SCNC: 33 MMOL/L (ref 23–29)
CREAT SERPL-MCNC: 0.8 MG/DL (ref 0.5–1.4)
DIFFERENTIAL METHOD: ABNORMAL
EOSINOPHIL # BLD AUTO: 0.1 K/UL (ref 0–0.5)
EOSINOPHIL NFR BLD: 2.7 % (ref 0–8)
ERYTHROCYTE [DISTWIDTH] IN BLOOD BY AUTOMATED COUNT: 18.4 % (ref 11.5–14.5)
EST. GFR  (AFRICAN AMERICAN): >60 ML/MIN/1.73 M^2
EST. GFR  (NON AFRICAN AMERICAN): >60 ML/MIN/1.73 M^2
GLUCOSE SERPL-MCNC: 88 MG/DL (ref 70–110)
HCT VFR BLD AUTO: 31.6 % (ref 40–54)
HGB BLD-MCNC: 10.1 G/DL (ref 14–18)
LYMPHOCYTES # BLD AUTO: 1.1 K/UL (ref 1–4.8)
LYMPHOCYTES NFR BLD: 21.7 % (ref 18–48)
MCH RBC QN AUTO: 27.7 PG (ref 27–31)
MCHC RBC AUTO-ENTMCNC: 32 G/DL (ref 32–36)
MCV RBC AUTO: 87 FL (ref 82–98)
MONOCYTES # BLD AUTO: 0.6 K/UL (ref 0.3–1)
MONOCYTES NFR BLD: 11.4 % (ref 4–15)
NEUTROPHILS # BLD AUTO: 3.4 K/UL (ref 1.8–7.7)
NEUTROPHILS NFR BLD: 64 % (ref 38–73)
PLATELET # BLD AUTO: 169 K/UL (ref 150–350)
PMV BLD AUTO: 10.3 FL (ref 9.2–12.9)
POTASSIUM SERPL-SCNC: 3.3 MMOL/L (ref 3.5–5.1)
RBC # BLD AUTO: 3.65 M/UL (ref 4.6–6.2)
SODIUM SERPL-SCNC: 137 MMOL/L (ref 136–145)
WBC # BLD AUTO: 5.26 K/UL (ref 3.9–12.7)

## 2019-07-31 PROCEDURE — 90471 IMMUNIZATION ADMIN: CPT | Performed by: INTERNAL MEDICINE

## 2019-07-31 PROCEDURE — 94761 N-INVAS EAR/PLS OXIMETRY MLT: CPT

## 2019-07-31 PROCEDURE — 36415 COLL VENOUS BLD VENIPUNCTURE: CPT

## 2019-07-31 PROCEDURE — G0009 ADMIN PNEUMOCOCCAL VACCINE: HCPCS | Performed by: INTERNAL MEDICINE

## 2019-07-31 PROCEDURE — 63600175 PHARM REV CODE 636 W HCPCS: Performed by: INTERNAL MEDICINE

## 2019-07-31 PROCEDURE — 85025 COMPLETE CBC W/AUTO DIFF WBC: CPT

## 2019-07-31 PROCEDURE — 90670 PCV13 VACCINE IM: CPT | Performed by: INTERNAL MEDICINE

## 2019-07-31 PROCEDURE — 25000003 PHARM REV CODE 250: Performed by: STUDENT IN AN ORGANIZED HEALTH CARE EDUCATION/TRAINING PROGRAM

## 2019-07-31 PROCEDURE — 80048 BASIC METABOLIC PNL TOTAL CA: CPT

## 2019-07-31 RX ORDER — FUROSEMIDE 80 MG/1
80 TABLET ORAL 2 TIMES DAILY
Qty: 60 TABLET | Refills: 11 | Status: SHIPPED | OUTPATIENT
Start: 2019-07-31 | End: 2020-01-01

## 2019-07-31 RX ORDER — LISINOPRIL 20 MG/1
40 TABLET ORAL DAILY
Status: DISCONTINUED | OUTPATIENT
Start: 2019-07-31 | End: 2019-07-31 | Stop reason: HOSPADM

## 2019-07-31 RX ORDER — LISINOPRIL 40 MG/1
40 TABLET ORAL DAILY
Qty: 90 TABLET | Refills: 3
Start: 2019-08-01 | End: 2020-01-01

## 2019-07-31 RX ADMIN — CLOPIDOGREL BISULFATE 75 MG: 75 TABLET ORAL at 09:07

## 2019-07-31 RX ADMIN — FUROSEMIDE 80 MG: 40 TABLET ORAL at 09:07

## 2019-07-31 RX ADMIN — ASPIRIN 81 MG: 81 TABLET, COATED ORAL at 09:07

## 2019-07-31 RX ADMIN — FLUTICASONE PROPIONATE 50 MCG: 50 SPRAY, METERED NASAL at 09:07

## 2019-07-31 RX ADMIN — ATORVASTATIN CALCIUM 40 MG: 40 TABLET, FILM COATED ORAL at 09:07

## 2019-07-31 RX ADMIN — CETIRIZINE HYDROCHLORIDE 10 MG: 10 TABLET, FILM COATED ORAL at 09:07

## 2019-07-31 RX ADMIN — LISINOPRIL 40 MG: 20 TABLET ORAL at 09:07

## 2019-07-31 RX ADMIN — PNEUMOCOCCAL 13-VALENT CONJUGATE VACCINE 0.5 ML: 2.2; 2.2; 2.2; 2.2; 2.2; 4.4; 2.2; 2.2; 2.2; 2.2; 2.2; 2.2; 2.2 INJECTION, SUSPENSION INTRAMUSCULAR at 03:07

## 2019-07-31 NOTE — NURSING
Report given to anel at [facility]. IV and heart monitor removed.

## 2019-07-31 NOTE — PLAN OF CARE
spoke with Magdalene [facility], [phone], confirmed that patient was ready to be accepted and provided number for report, [phone], RM [phone].  called in transportation request with [facility], [phone], spoke with Ade, transportation set for 3:30 PM. Bedside nurse, Codey,  has been informed and verbalized understanding.        07/31/19 1432   Post-Acute Status   Post-Acute Authorization Placement   Post-Acute Placement Status Set-up Complete

## 2019-07-31 NOTE — NURSING
Notified primary team about patient's /75(108). Mentioned patient has been hypertensive (160-170's systolic) throughout night but also slightly anxious (pressing call light for someone to simply be present in room with him), and HR remains SB in 40's when awake. AAO, denies pain. No new orders yet, will cont to monitor.

## 2019-07-31 NOTE — CONSULTS
Consult Note  Palliative Care      Consult Requested By: Momo Pichardo MD  Reason for Consult:Goals of care discussion/advance care planning    SUBJECTIVE:     History of Present Illness:  Disease Process: Acute on chronic combined systolic and diastolic CHF    Nicolas Wolff is a 88 y.o.  male with a significant  past medical history of CHF (congestive heart failure), Coronary artery disease S/P CABG x 5 (10/29/2007), who presents to the ED on 7/26/2019 from [facility] complaining of SOB that has been going on for the last 2-3 days.  He reports that for the last month both his legs have been red and getting swollen.  He reports that during this time at [facility], he has been given antibiotics for presumed skin infection in his legs. Pt denies fever chills, chest pain, nausea, vomiting.  Reports that the SOB is non progressive, has not gotten better or worse, and is associated with a non productive cough that started 1 day ago.  He denies eating a more saltier diet.  Denies drinking more fluids than normal.  Denies having any problems with sleeping flat. Reports that his leg swell more when he is sitting in wheel chair.       Palliative medicine consulted for [facility].  Patient wishes to continue seeking treatment at this time and remain full code.     Past Medical History:   Diagnosis Date    CHF (congestive heart failure)     Coronary artery disease     Coronary artery disease involving native coronary artery of native heart with angina pectoris 5/1/2019    Hypertension     Ischemic cardiomyopathy 5/1/2019    LVEF of 25% on 4/29/19.   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  No LIMA to LAD graft due to 80% left subclavian artery stenosis.    Stenosis of left subclavian artery 10/25/2007     Past Surgical History:   Procedure Laterality Date    BACK  SURGERY      CORONARY ARTERY BYPASS GRAFT       History reviewed. No pertinent family history.  Social History     Tobacco Use    Smoking status: Former Smoker    Smokeless tobacco: Never Used   Substance Use Topics    Alcohol use: No    Drug use: No       Mental Status: Oriented x3    ECOG Performance Status Grade: 1 - Ambulates, capable of light work        OBJECTIVE:     Pain Assessment: No pain reported at this time    Decision-Making Capacity: Patient answered questions    Advanced Directives:  Living Will: No  Do Not Resuscitate Status: No  Medical Power of : No  Registered Organ Donor: No    Living Arrangements: Lives alone    Psychosocial, Spiritual, Cultural:  Patient's most important priorities:  none    Patient's biggest concerns/fears:  none    Previous death/end of life care history:  none    Patient's goals/hopes:  none    ASSESSMENT/PLAN:     Recommendations:  Continue medical treatment  Code status: Full Code    Thank you for the consult and the opportunity to participate in Mr. Wolff's care.       Toya Barnes, MSN, APRN, NP-C   Palliative Medicine   [facility]  [phone] or [phone]        >50% of 60  min visit spent in chart review, face to face discussion of goals of care with patient, family, symptom assessment, coordination of care and emotional support.

## 2019-07-31 NOTE — PLAN OF CARE
Problem: Adult Inpatient Plan of Care  Goal: Plan of Care Review  Outcome: Ongoing (interventions implemented as appropriate)  Patient putting out adequate urine tonight after evening lasix dose, however despite scheduled trazodone slept very little tonight. Education on fluid restriction given. Patient remains AAO, denies pain, and repositions self in bed besides occasional assistance from staff. Lower extremity edema still present at 2-3+. BP on higher side but does get worked up occasionally and once is calmed down/resting, BP under 180 systolic. Bed locked and in low position, bed alarm on and call light within reach. Will cont to monitor.

## 2019-07-31 NOTE — PLAN OF CARE
07/31/19 1519   Final Note   Assessment Type Final Discharge Note   Anticipated Discharge Disposition long term Nu   Discharge plans and expectations educations in teach back method with documentation complete? Yes   Right Care Referral Info   Post Acute Recommendation Other   Facility Name [facility]     Kayla Medrano, RN, [facility], CMSRN  RN Transition Navigator  [phone]

## 2019-07-31 NOTE — NURSING
He wants to be around people he is Sitting in wheelchair at the nurses station A-side by the unit secretary Christie.

## 2019-07-31 NOTE — PLAN OF CARE
Problem: Adult Inpatient Plan of Care  Goal: Plan of Care Review  Outcome: Ongoing (interventions implemented as appropriate)  POC reviewed with patient. Patient AAOx4 and denies any pain. Tele monitor in place reading NSR/SB. Cardio consulted completed and plan is for patient to not have a pace maker placed. BP was reading low on left arm d/t stenosis. Left arm BP WNL. Palliative care consult placed. Bed alarm on, bed locked and low, side rails up x2, and call bell in reach. Patient verbalizes clear understanding of POC.

## 2019-07-31 NOTE — PLAN OF CARE
[facility]    FACILITY TRANSFER ORDERS      Patient Name: Nicolas Wolff  YOB: 1931    PCP: Ankur Morgan MD   PCP Address: [address] / [place] [address]  PCP Phone Number: [phone]  PCP Fax: [phone]    Encounter Date: 07/31/2019    Admit to: [facility]    Vital Signs:  Routine    Diagnoses:   Active Hospital Problems    Diagnosis  POA    *Acute on chronic combined systolic and diastolic congestive heart failure [I50.43]  Yes    Palliative care encounter [Z51.5]  Not Applicable    Goals of care, counseling/discussion [Z71.89]  Not Applicable    Counseling regarding advanced care planning and goals of care [Z71.89]  Not Applicable    Hypertension [I10]  Yes    Shortness of breath [R06.02]  Yes    Acute on chronic congestive heart failure [I50.9]  Yes    Normocytic anemia [D64.9]  Yes    Coronary artery disease involving native coronary artery of native heart without angina pectoris [I25.10]  Yes      Resolved Hospital Problems   No resolved problems to display.       Allergies:Review of patient's allergies indicates:  No Known Allergies    Diet: cardiac diet    Activities: Activity as tolerated    Nursing: routine     Labs: none    CONSULTS:    Physical Therapy to evaluate and treat.  and Occupational Therapy to evaluate and treat.   PT/OT 5 days per week under Part B.    MISCELLANEOUS CARE:  n/a    WOUND CARE ORDERS  None    Medications: Review discharge medications with patient and family and provide education.      Current Discharge Medication List      START taking these medications    Details   !! furosemide (LASIX) 80 MG tablet Take 1 tablet (80 mg total) by mouth 2 (two) times daily.  Qty: 60 tablet, Refills: 11      !! lisinopril (PRINIVIL,ZESTRIL) 20 MG tablet Take 1 tablet (20 mg total) by mouth once daily.  Qty: 90 tablet, Refills: 3      !! lisinopril (PRINIVIL,ZESTRIL) 40 MG tablet Take 1 tablet (40 mg total) by mouth once  daily.  Qty: 90 tablet, Refills: 3       !! - Potential duplicate medications found. Please discuss with provider.      CONTINUE these medications which have CHANGED    Details   !! furosemide (LASIX) 40 MG tablet Take 2 tablets (80 mg total) by mouth 2 (two) times daily.       !! - Potential duplicate medications found. Please discuss with provider.      CONTINUE these medications which have NOT CHANGED    Details   aspirin (ECOTRIN) 81 MG EC tablet Take 1 tablet (81 mg total) by mouth once daily.  Qty: 60 tablet, Refills: 5      atorvastatin (LIPITOR) 40 MG tablet Take 1 tablet (40 mg total) by mouth once daily.  Qty: 90 tablet, Refills: 3      carvedilol (COREG) 3.125 MG tablet Take 1 tablet (3.125 mg total) by mouth 2 (two) times daily with meals.  Qty: 60 tablet, Refills: 11      clopidogrel (PLAVIX) 75 mg tablet Take 1 tablet (75 mg total) by mouth once daily.  Qty: 30 tablet, Refills: 11      diphenhydrAMINE (BENADRYL) 25 mg capsule Take 25 mg by mouth every 6 (six) hours as needed for Itching.      loratadine (CLARITIN REDITABS) 10 mg dissolvable tablet Take 10 mg by mouth once daily.      sodium chloride 1 gram tablet Take 1 g by mouth 3 (three) times daily.      traZODone (DESYREL) 50 MG tablet Take 50 mg by mouth every evening.      fluticasone propionate (FLONASE) 50 mcg/actuation nasal spray 1 spray by Each Nostril route once daily.      multivitamin capsule Take 1 capsule by mouth once daily.      nystatin, bulk, 100 million unit Powd by Misc.(Non-Drug; Combo Route) route.      potassium chloride (MICRO-K) 10 MEQ CpSR Take 10 mEq by mouth once.         STOP taking these medications       fluconazole (DIFLUCAN) 200 MG Tab Comments:   Reason for Stopping:         doxycycline (VIBRAMYCIN) 100 MG Cap Comments:   Reason for Stopping:                    _________________________________  Marlon Gonzales MD  07/31/2019

## 2019-07-31 NOTE — PLAN OF CARE
spoke with Magdalene, at [facility], [phone], informed that patient's D/C orders have been attached, Magdalene informed  that she will review and follow iup once ready to provide number for report.        07/31/19 1246   Post-Acute Status   Post-Acute Authorization Placement   Post-Acute Placement Status Awaiting Clarification Orders

## 2019-07-31 NOTE — PROGRESS NOTES
[facility]  Resident DANYELLEI Progress Note    Subjective:      Patient resting in chair. Sated denied SOB. Denies chest pain.  Patient talkative at bedside. Patient enthusiastic on wanting to be discharged.      Objective:   Last 24 Hour Vital Signs:  BP  Min: 63/31  Max: 191/75  Temp  Av.5 °F (36.4 °C)  Min: 96.7 °F (35.9 °C)  Max: 98 °F (36.7 °C)  Pulse  Av.7  Min: 42  Max: 50  Resp  Av.8  Min: 16  Max: 20  SpO2  Av.3 %  Min: 95 %  Max: 99 %  I/O last 3 completed shifts:  In: 720 [P.O.:720]  Out: 3175 [Urine:3175]    Physical Examination:  Gen: Alert, NAD.   Head: Atraumatic, normocephalic  Eyes: Pupils equal and reactive to light,extra-ocular eye movements intact  CV: RRR, II/VI mid-systolic murmur heard at right upper sternal border     Lungs: bilateral end-expiratory wheezing. Decreased breath sounds in RLL and LLL  Abd: Soft, non tender, non distended, bowel sounds present. No rebound tenderness or guarding present.    EXT: Bilateral 1+ pitting edema from feet to hips. Bilateral redness in both legs at mid shins, non tender, not warm.    Neuro: grossly normal    Skin: Warm and dry.  No jaundice noted.      .    Laboratory:  Laboratory Data Reviewed: yes  CBC:   Recent Labs   Lab 19  0533   WBC 5.26   RBC 3.65*   HGB 10.1*   HCT 31.6*      MCV 87   MCH 27.7   MCHC 32.0     CMP:   Recent Labs   Lab 19  1945  19  0533      < > 88   CALCIUM 8.4*   < > 8.5*   ALBUMIN 3.3*  --   --    PROT 6.3  --   --       < > 137   K 4.3   < > 3.3*   CO2 21*   < > 33*      < > 95   BUN 17   < > 17   CREATININE 0.8   < > 0.8   ALKPHOS 104  --   --    ALT 21  --   --    AST 29  --   --    BILITOT 0.5  --   --     < > = values in this interval not displayed.       Current Medications:     Infusions:       Scheduled:   aspirin  81 mg Oral Daily    atorvastatin  40 mg Oral Daily    cetirizine  10 mg Oral Daily    clopidogrel  75 mg Oral Daily    enoxaparin  40 mg Subcutaneous  Daily    fluticasone propionate  1 spray Each Nostril Daily    furosemide  80 mg Oral BID    lisinopril  40 mg Oral Daily    traZODone  25 mg Oral QHS        PRN:  acetaminophen, guaifenesin 100 mg/5 ml, ondansetron, pneumoc 13-edwin conj-dip cr(PF), ramelteon, sodium chloride 0.9%, sodium chloride 0.9%      Assessment:     Nicolas Wolff is a 88 y.o.male with  Patient Active Problem List    Diagnosis Date Noted    Hypertension 07/27/2019    Shortness of breath 07/27/2019    Acute on chronic congestive heart failure     Acute on chronic combined systolic and diastolic congestive heart failure 07/26/2019    Normocytic anemia 07/26/2019    Coronary artery disease involving native coronary artery of native heart without angina pectoris 05/01/2019    LV dysfunction 05/01/2019    Ischemic cardiomyopathy 05/01/2019    Nonrheumatic aortic valve stenosis 05/01/2019    Vertebral artery occlusion, left     Vertigo     TIA (transient ischemic attack) 04/29/2019    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 Acute on chronic combined systolic and diastolic CHF  -pt reporting about 1 month hx of bilateral leg swelling, elevated JVD, 2-3 day history of SOB  -CXR (7/26/19) consistent with decompensated HF, BNP 3183, 3+ pitting edema from feet to thighs bilaterally clinically consistent with CHF exasperation   -Got 80mg Lasix IV in ED, continued lasix IV 80 mg BID during admission   -HR low 42 yesterday. Low BP was 63/31 yesterday/ Patient saturated WNL  -Neg negative on I/O yesterday. Tolerating PO lasix     2. Normocytic Anemia   -on admit H/H 10.2/31.2 MCV: 87  -iron studies mixed, will need repeat studies as outpatient   -daily CBC     3.  CAD s/p CABG  -resumed home medications: ASA 81 PO daily, clopidogrel 75 mg PO daily, Lipitor 40 mg daily    4. HLD  -continued home Lipitor 40 daily     5. HTN  -pt denies hx of HTN and not on any antihypertensive meds  -BP  elevated around 172/74 on admission. Overnight BP got up to 220/87  -started Lisinopril 20 mg PO daily, will monitor and increase as needed    6. Severe Aortic Stenosis   -TTE 4/30/2019 significant for severe aortic stenosis (AV area 0.91 cm2)  -gently diuresed while admitted    Diet: Cardiac  PPx: enoxaparin  DISPO: cardiology  w/no plan to intervene; patient likely discharged            Nick Shasha  [facility] Internal Medicine HO-I  U IM Service Team B    [facility] Medicine Hospitalist Pager numbers:   [facility] Hospitalist Medicine Team A (Bjorn/Tamar): [phone]  [facility] Hospitalist Medicine Team B (Aaron/Marino):  [phone]

## 2019-07-31 NOTE — PLAN OF CARE
Tele reviewed for overnight.  Patient remained in sinus bradycardia with occasional PAC or PVCs.  No high degree AVB.  No acute event recorded overnight.  No cardiac intervention at this time.

## 2020-01-01 ENCOUNTER — LAB VISIT (OUTPATIENT)
Dept: LAB | Facility: OTHER | Age: 85
End: 2020-01-01
Payer: MEDICARE

## 2020-01-01 ENCOUNTER — LAB VISIT (OUTPATIENT)
Dept: PRIMARY CARE CLINIC | Facility: OTHER | Age: 85
End: 2020-01-01
Payer: MEDICARE

## 2020-01-01 DIAGNOSIS — Z20.822 SUSPECTED COVID-19 VIRUS INFECTION: ICD-10-CM

## 2020-01-01 DIAGNOSIS — Z03.818 ENCOUNTER FOR OBSERVATION FOR SUSPECTED EXPOSURE TO OTHER BIOLOGICAL AGENTS RULED OUT: ICD-10-CM

## 2020-01-01 LAB
SARS-COV-2 RNA RESP QL NAA+PROBE: DETECTED
SARS-COV-2 RNA RESP QL NAA+PROBE: NEGATIVE
SARS-COV-2 RNA RESP QL NAA+PROBE: NOT DETECTED
SARS-COV-2 RNA RESP QL NAA+PROBE: NOT DETECTED

## 2020-01-01 PROCEDURE — U0003 INFECTIOUS AGENT DETECTION BY NUCLEIC ACID (DNA OR RNA); SEVERE ACUTE RESPIRATORY SYNDROME CORONAVIRUS 2 (SARS-COV-2) (CORONAVIRUS DISEASE [COVID-19]), AMPLIFIED PROBE TECHNIQUE, MAKING USE OF HIGH THROUGHPUT TECHNOLOGIES AS DESCRIBED BY CMS-2020-01-R: HCPCS

## 2020-01-01 PROCEDURE — U0003 INFECTIOUS AGENT DETECTION BY NUCLEIC ACID (DNA OR RNA); SEVERE ACUTE RESPIRATORY SYNDROME CORONAVIRUS 2 (SARS-COV-2) (CORONAVIRUS DISEASE [COVID-19]), AMPLIFIED PROBE TECHNIQUE, MAKING USE OF HIGH THROUGHPUT TECHNOLOGIES AS DESCRIBED BY CMS-2020-01-R: HCPCS | Mod: ST72

## 2020-01-01 NOTE — PLAN OF CARE
Problem: Adult Inpatient Plan of Care  Goal: Plan of Care Review  Outcome: Ongoing (interventions implemented as appropriate)  Plan of care reviewed patient verbalized understanding. Medications administered. Cardiac monitoring sinus bradycardia HR45. Safety maintained bed in the lowest position, call bell within reach, bed alarm on.        [phone]

## 2021-01-01 ENCOUNTER — HOSPITAL ENCOUNTER (EMERGENCY)
Facility: HOSPITAL | Age: 86
End: 2021-04-05
Attending: EMERGENCY MEDICINE
Payer: MEDICARE

## 2021-01-01 DIAGNOSIS — I46.9 CARDIAC ARREST: Primary | ICD-10-CM

## 2021-01-01 PROCEDURE — 99900035 HC TECH TIME PER 15 MIN (STAT)

## 2021-01-01 PROCEDURE — 99285 EMERGENCY DEPT VISIT HI MDM: CPT | Mod: 25

## 2021-01-01 PROCEDURE — 63600175 PHARM REV CODE 636 W HCPCS: Performed by: EMERGENCY MEDICINE

## 2021-01-01 PROCEDURE — 96374 THER/PROPH/DIAG INJ IV PUSH: CPT

## 2021-01-01 PROCEDURE — 94002 VENT MGMT INPAT INIT DAY: CPT

## 2021-01-01 PROCEDURE — 92950 HEART/LUNG RESUSCITATION CPR: CPT

## 2021-01-01 PROCEDURE — 63600175 PHARM REV CODE 636 W HCPCS

## 2021-01-01 PROCEDURE — 25000003 PHARM REV CODE 250: Performed by: EMERGENCY MEDICINE

## 2021-01-01 RX ORDER — EPINEPHRINE 0.1 MG/ML
INJECTION INTRAVENOUS CODE/TRAUMA/SEDATION MEDICATION
Status: COMPLETED | OUTPATIENT
Start: 2021-01-01 | End: 2021-01-01

## 2021-01-01 RX ORDER — MAGNESIUM SULFATE HEPTAHYDRATE 40 MG/ML
INJECTION, SOLUTION INTRAVENOUS
Status: COMPLETED
Start: 2021-01-01 | End: 2021-01-01

## 2021-01-01 RX ORDER — SODIUM BICARBONATE 1 MEQ/ML
SYRINGE (ML) INTRAVENOUS CODE/TRAUMA/SEDATION MEDICATION
Status: COMPLETED | OUTPATIENT
Start: 2021-01-01 | End: 2021-01-01

## 2021-01-01 RX ORDER — AMIODARONE HYDROCHLORIDE 150 MG/3ML
INJECTION, SOLUTION INTRAVENOUS CODE/TRAUMA/SEDATION MEDICATION
Status: COMPLETED | OUTPATIENT
Start: 2021-01-01 | End: 2021-01-01

## 2021-01-01 RX ORDER — CALCIUM CHLORIDE INJECTION 100 MG/ML
INJECTION, SOLUTION INTRAVENOUS CODE/TRAUMA/SEDATION MEDICATION
Status: COMPLETED | OUTPATIENT
Start: 2021-01-01 | End: 2021-01-01

## 2021-01-01 RX ORDER — ATROPINE SULFATE 0.1 MG/ML
INJECTION INTRAVENOUS CODE/TRAUMA/SEDATION MEDICATION
Status: COMPLETED | OUTPATIENT
Start: 2021-01-01 | End: 2021-01-01

## 2021-01-01 RX ADMIN — EPINEPHRINE 1 MG: 0.1 INJECTION INTRACARDIAC; INTRAVENOUS at 11:04

## 2021-01-01 RX ADMIN — MAGNESIUM SULFATE IN WATER 2 G: 40 INJECTION, SOLUTION INTRAVENOUS at 11:04

## 2021-01-01 RX ADMIN — SODIUM BICARBONATE 50 MEQ: 84 INJECTION, SOLUTION INTRAVENOUS at 11:04

## 2021-01-01 RX ADMIN — AMIODARONE HYDROCHLORIDE 150 MG: 50 INJECTION, SOLUTION INTRAVENOUS at 11:04

## 2021-01-01 RX ADMIN — DEXTROSE MONOHYDRATE 25 G: 25 INJECTION, SOLUTION INTRAVENOUS at 11:04

## 2021-01-01 RX ADMIN — CALCIUM CHLORIDE 1 G: 100 INJECTION, SOLUTION INTRAVENOUS; INTRAVENTRICULAR at 11:04

## 2021-01-01 RX ADMIN — ATROPINE SULFATE 1 MG: 0.1 INJECTION PARENTERAL at 11:04

## 2021-12-01 NOTE — PROGRESS NOTES
Discharge Planning Assessment    SW discharge planner met with patient/ (and family member) to discuss reason for admission, current living situation, and potential needs at the time of discharge    Demographics/Insurance verified: Yes/[employer]    Current Type of Dwelling: Patient reports that he lives in a 1st floor condo. Patient from ECF/ confirmed with: N/A    Living arrangements: Patient reports that he lives alone. States that he and his spouse are  but that spouse is supportive and he has a large support system. Steps to enter home/inside home: 1 step    History of Falls: None    Level of function/Support: Patient reports that he is able to complete ADLs and IADLs independently. Lines (IV antibiotics, PICC, Dialysis, Mid-line, Port): Patient reports that he has a port. TUBES (Chest Tube, NG, Peg Tub, J-Tube, CORPAK): Had a NG tube but tube was removed today 12/01/2021. DIET: NPO    WOUNDS (Wound Vac/Wound Care, Catheter): None    PCP: Ryan Grijalva MD    Last Visit to PCP: March 2021    Pharmacy: [place] [id number]    Medication Compliance Issues: None    DME: None                                                                                       Active with any community resources agencies/skilled home care: Not active with home care services presently. Has been active with [facility] in the past.    Financial issues that could impact healthcare: None    Transportation at the time of discharge: Patient drove himself to [facility]. Reports that he will drive himself home. SW discussed and provided facilities of choice if transition to a skilled nursing facility is required at time of discharge    Tentative discharge plan: TBD    SW will continue to follow for any psychosocial and discharge needs.     Pola Brantley, MSW, LSW  [phone] The Sw faxed the pt's info to Magdalene at [facility] via [facility]. The Sw spoke to Magdalene and she states she will review the pt's info and call the Sw back with a determination.